# Patient Record
Sex: MALE | Race: BLACK OR AFRICAN AMERICAN | Employment: UNEMPLOYED | URBAN - METROPOLITAN AREA
[De-identification: names, ages, dates, MRNs, and addresses within clinical notes are randomized per-mention and may not be internally consistent; named-entity substitution may affect disease eponyms.]

---

## 2021-04-01 ENCOUNTER — HOSPITAL ENCOUNTER (EMERGENCY)
Age: 51
Discharge: HOME | End: 2021-04-01
Payer: COMMERCIAL

## 2021-04-01 VITALS
WEIGHT: 207 LBS | SYSTOLIC BLOOD PRESSURE: 122 MMHG | RESPIRATION RATE: 18 BRPM | OXYGEN SATURATION: 99 % | TEMPERATURE: 98.2 F | HEART RATE: 88 BPM | HEIGHT: 74 IN | DIASTOLIC BLOOD PRESSURE: 78 MMHG | BODY MASS INDEX: 26.56 KG/M2

## 2021-04-01 DIAGNOSIS — T30.0 BURN: Primary | ICD-10-CM

## 2021-04-01 PROCEDURE — 2500000003 HC RX 250 WO HCPCS: Performed by: PHYSICIAN ASSISTANT

## 2021-04-01 PROCEDURE — 99285 EMERGENCY DEPT VISIT HI MDM: CPT

## 2021-04-01 RX ORDER — CEPHALEXIN 500 MG/1
500 CAPSULE ORAL 4 TIMES DAILY
Qty: 40 CAPSULE | Refills: 0 | Status: SHIPPED | OUTPATIENT
Start: 2021-04-01 | End: 2021-04-04 | Stop reason: ALTCHOICE

## 2021-04-01 RX ORDER — HYDROCODONE BITARTRATE AND ACETAMINOPHEN 5; 325 MG/1; MG/1
1 TABLET ORAL EVERY 4 HOURS PRN
Qty: 8 TABLET | Refills: 0 | Status: SHIPPED | OUTPATIENT
Start: 2021-04-01 | End: 2021-04-04

## 2021-04-01 RX ORDER — CEPHALEXIN 500 MG/1
500 CAPSULE ORAL 4 TIMES DAILY
Qty: 40 CAPSULE | Refills: 0 | Status: SHIPPED | OUTPATIENT
Start: 2021-04-01 | End: 2021-04-01 | Stop reason: SDUPTHER

## 2021-04-01 RX ADMIN — SILVER SULFADIAZINE: 10 CREAM TOPICAL at 19:35

## 2021-04-01 ASSESSMENT — PAIN SCALES - GENERAL: PAINLEVEL_OUTOF10: 10

## 2021-04-01 ASSESSMENT — PAIN DESCRIPTION - ORIENTATION: ORIENTATION: LEFT
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
## 2021-04-04 ENCOUNTER — HOSPITAL ENCOUNTER (EMERGENCY)
Age: 51
Discharge: HOME | DRG: 383 | End: 2021-04-04
Attending: EMERGENCY MEDICINE
Payer: COMMERCIAL

## 2021-04-04 VITALS
RESPIRATION RATE: 12 BRPM | HEART RATE: 85 BPM | BODY MASS INDEX: 25.67 KG/M2 | TEMPERATURE: 98.7 F | WEIGHT: 200 LBS | HEIGHT: 74 IN | DIASTOLIC BLOOD PRESSURE: 89 MMHG | SYSTOLIC BLOOD PRESSURE: 126 MMHG | OXYGEN SATURATION: 100 %

## 2021-04-04 DIAGNOSIS — T30.0 BURN: Primary | ICD-10-CM

## 2021-04-04 PROCEDURE — 6370000000 HC RX 637 (ALT 250 FOR IP): Performed by: EMERGENCY MEDICINE

## 2021-04-04 PROCEDURE — 99283 EMERGENCY DEPT VISIT LOW MDM: CPT

## 2021-04-04 RX ORDER — CLINDAMYCIN HYDROCHLORIDE 150 MG/1
300 CAPSULE ORAL ONCE
Status: COMPLETED | OUTPATIENT
Start: 2021-04-04 | End: 2021-04-04

## 2021-04-04 RX ORDER — CLINDAMYCIN HYDROCHLORIDE 300 MG/1
300 CAPSULE ORAL 3 TIMES DAILY
Qty: 30 CAPSULE | Refills: 0 | Status: ON HOLD | OUTPATIENT
Start: 2021-04-04 | End: 2021-04-08 | Stop reason: HOSPADM

## 2021-04-04 RX ORDER — ONDANSETRON 4 MG/1
4 TABLET, ORALLY DISINTEGRATING ORAL EVERY 8 HOURS PRN
Qty: 15 TABLET | Refills: 0 | Status: SHIPPED | OUTPATIENT
Start: 2021-04-04 | End: 2021-10-10

## 2021-04-04 RX ADMIN — CLINDAMYCIN HYDROCHLORIDE 300 MG: 150 CAPSULE ORAL at 22:48

## 2021-04-04 ASSESSMENT — PAIN DESCRIPTION - ORIENTATION: ORIENTATION: LEFT

## 2021-04-04 ASSESSMENT — ENCOUNTER SYMPTOMS
WHEEZING: 0
COLOR CHANGE: 1
SHORTNESS OF BREATH: 0
VOMITING: 0
ABDOMINAL PAIN: 0
CHEST TIGHTNESS: 0
COUGH: 0
FACIAL SWELLING: 0
NAUSEA: 0
RESPIRATORY NEGATIVE: 1
GASTROINTESTINAL NEGATIVE: 1

## 2021-04-04 ASSESSMENT — PAIN DESCRIPTION - LOCATION: LOCATION: ARM
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
## 2021-04-06 ENCOUNTER — HOSPITAL ENCOUNTER (INPATIENT)
Age: 51
LOS: 2 days | Discharge: HOME | DRG: 383 | End: 2021-04-08
Attending: INTERNAL MEDICINE | Admitting: INTERNAL MEDICINE
Payer: COMMERCIAL

## 2021-04-06 DIAGNOSIS — L03.114 CELLULITIS OF LEFT UPPER EXTREMITY: Primary | ICD-10-CM

## 2021-04-06 DIAGNOSIS — Z78.9 FAILURE OF OUTPATIENT TREATMENT: ICD-10-CM

## 2021-04-06 DIAGNOSIS — T30.0 BURN: ICD-10-CM

## 2021-04-06 PROBLEM — L03.90 CELLULITIS: Status: ACTIVE | Noted: 2021-04-06

## 2021-04-06 LAB
ALBUMIN SERPL-MCNC: 3.4 GM/DL (ref 3.4–5)
ALP BLD-CCNC: 97 IU/L (ref 40–129)
ALT SERPL-CCNC: 32 U/L (ref 10–40)
ANION GAP SERPL CALCULATED.3IONS-SCNC: 11 MMOL/L (ref 4–16)
ANION GAP SERPL CALCULATED.3IONS-SCNC: 9 MMOL/L (ref 4–16)
AST SERPL-CCNC: 28 IU/L (ref 15–37)
BASOPHILS ABSOLUTE: 0 K/CU MM
BASOPHILS ABSOLUTE: 0 K/CU MM
BASOPHILS RELATIVE PERCENT: 0.2 % (ref 0–1)
BASOPHILS RELATIVE PERCENT: 0.2 % (ref 0–1)
BILIRUB SERPL-MCNC: 0.4 MG/DL (ref 0–1)
BUN BLDV-MCNC: 13 MG/DL (ref 6–23)
BUN BLDV-MCNC: 14 MG/DL (ref 6–23)
CALCIUM SERPL-MCNC: 8.6 MG/DL (ref 8.3–10.6)
CALCIUM SERPL-MCNC: 8.6 MG/DL (ref 8.3–10.6)
CHLORIDE BLD-SCNC: 101 MMOL/L (ref 99–110)
CHLORIDE BLD-SCNC: 99 MMOL/L (ref 99–110)
CO2: 23 MMOL/L (ref 21–32)
CO2: 26 MMOL/L (ref 21–32)
CREAT SERPL-MCNC: 1.4 MG/DL (ref 0.9–1.3)
CREAT SERPL-MCNC: 1.5 MG/DL (ref 0.9–1.3)
DIFFERENTIAL TYPE: ABNORMAL
DIFFERENTIAL TYPE: ABNORMAL
EOSINOPHILS ABSOLUTE: 0.1 K/CU MM
EOSINOPHILS ABSOLUTE: 0.1 K/CU MM
EOSINOPHILS RELATIVE PERCENT: 2.1 % (ref 0–3)
EOSINOPHILS RELATIVE PERCENT: 2.4 % (ref 0–3)
GFR AFRICAN AMERICAN: >60 ML/MIN/1.73M2
GFR AFRICAN AMERICAN: >60 ML/MIN/1.73M2
GFR NON-AFRICAN AMERICAN: 50 ML/MIN/1.73M2
GFR NON-AFRICAN AMERICAN: 54 ML/MIN/1.73M2
GLUCOSE BLD-MCNC: 107 MG/DL (ref 70–99)
GLUCOSE BLD-MCNC: 125 MG/DL (ref 70–99)
HCT VFR BLD CALC: 47.6 % (ref 42–52)
HCT VFR BLD CALC: 48.4 % (ref 42–52)
HEMOGLOBIN: 15.3 GM/DL (ref 13.5–18)
HEMOGLOBIN: 15.7 GM/DL (ref 13.5–18)
IMMATURE NEUTROPHIL %: 0.4 % (ref 0–0.43)
IMMATURE NEUTROPHIL %: 0.4 % (ref 0–0.43)
LYMPHOCYTES ABSOLUTE: 0.5 K/CU MM
LYMPHOCYTES ABSOLUTE: 0.5 K/CU MM
LYMPHOCYTES RELATIVE PERCENT: 10.2 % (ref 24–44)
LYMPHOCYTES RELATIVE PERCENT: 11.6 % (ref 24–44)
MCH RBC QN AUTO: 26.9 PG (ref 27–31)
MCH RBC QN AUTO: 27.1 PG (ref 27–31)
MCHC RBC AUTO-ENTMCNC: 32.1 % (ref 32–36)
MCHC RBC AUTO-ENTMCNC: 32.4 % (ref 32–36)
MCV RBC AUTO: 83.4 FL (ref 78–100)
MCV RBC AUTO: 83.8 FL (ref 78–100)
MONOCYTES ABSOLUTE: 0.6 K/CU MM
MONOCYTES ABSOLUTE: 0.7 K/CU MM
MONOCYTES RELATIVE PERCENT: 12.3 % (ref 0–4)
MONOCYTES RELATIVE PERCENT: 14 % (ref 0–4)
NUCLEATED RBC %: 0 %
NUCLEATED RBC %: 0 %
PDW BLD-RTO: 12.6 % (ref 11.7–14.9)
PDW BLD-RTO: 12.7 % (ref 11.7–14.9)
PLATELET # BLD: 187 K/CU MM (ref 140–440)
PLATELET # BLD: 191 K/CU MM (ref 140–440)
PMV BLD AUTO: 10.8 FL (ref 7.5–11.1)
PMV BLD AUTO: 11 FL (ref 7.5–11.1)
POTASSIUM SERPL-SCNC: 3.6 MMOL/L (ref 3.5–5.1)
POTASSIUM SERPL-SCNC: 4 MMOL/L (ref 3.5–5.1)
RBC # BLD: 5.68 M/CU MM (ref 4.6–6.2)
RBC # BLD: 5.8 M/CU MM (ref 4.6–6.2)
SEGMENTED NEUTROPHILS ABSOLUTE COUNT: 3.3 K/CU MM
SEGMENTED NEUTROPHILS ABSOLUTE COUNT: 3.8 K/CU MM
SEGMENTED NEUTROPHILS RELATIVE PERCENT: 73.1 % (ref 36–66)
SEGMENTED NEUTROPHILS RELATIVE PERCENT: 73.1 % (ref 36–66)
SODIUM BLD-SCNC: 134 MMOL/L (ref 135–145)
SODIUM BLD-SCNC: 135 MMOL/L (ref 135–145)
TOTAL IMMATURE NEUTOROPHIL: 0.02 K/CU MM
TOTAL IMMATURE NEUTOROPHIL: 0.02 K/CU MM
TOTAL NUCLEATED RBC: 0 K/CU MM
TOTAL NUCLEATED RBC: 0 K/CU MM
TOTAL PROTEIN: 7.5 GM/DL (ref 6.4–8.2)
WBC # BLD: 4.6 K/CU MM (ref 4–10.5)
WBC # BLD: 5.2 K/CU MM (ref 4–10.5)

## 2021-04-06 PROCEDURE — 6360000002 HC RX W HCPCS: Performed by: PHYSICIAN ASSISTANT

## 2021-04-06 PROCEDURE — 2580000003 HC RX 258: Performed by: INTERNAL MEDICINE

## 2021-04-06 PROCEDURE — 36415 COLL VENOUS BLD VENIPUNCTURE: CPT

## 2021-04-06 PROCEDURE — 87040 BLOOD CULTURE FOR BACTERIA: CPT

## 2021-04-06 PROCEDURE — 80048 BASIC METABOLIC PNL TOTAL CA: CPT

## 2021-04-06 PROCEDURE — 99283 EMERGENCY DEPT VISIT LOW MDM: CPT

## 2021-04-06 PROCEDURE — 85025 COMPLETE CBC W/AUTO DIFF WBC: CPT

## 2021-04-06 PROCEDURE — 1200000000 HC SEMI PRIVATE

## 2021-04-06 PROCEDURE — 80053 COMPREHEN METABOLIC PANEL: CPT

## 2021-04-06 PROCEDURE — 2500000003 HC RX 250 WO HCPCS: Performed by: INTERNAL MEDICINE

## 2021-04-06 PROCEDURE — 2580000003 HC RX 258: Performed by: PHYSICIAN ASSISTANT

## 2021-04-06 RX ORDER — SODIUM CHLORIDE 9 MG/ML
INJECTION, SOLUTION INTRAVENOUS CONTINUOUS
Status: DISCONTINUED | OUTPATIENT
Start: 2021-04-06 | End: 2021-04-08 | Stop reason: HOSPADM

## 2021-04-06 RX ORDER — POLYETHYLENE GLYCOL 3350 17 G/17G
17 POWDER, FOR SOLUTION ORAL DAILY PRN
Status: DISCONTINUED | OUTPATIENT
Start: 2021-04-06 | End: 2021-04-08 | Stop reason: HOSPADM

## 2021-04-06 RX ORDER — SODIUM CHLORIDE 9 MG/ML
25 INJECTION, SOLUTION INTRAVENOUS PRN
Status: DISCONTINUED | OUTPATIENT
Start: 2021-04-06 | End: 2021-04-08 | Stop reason: HOSPADM

## 2021-04-06 RX ORDER — ONDANSETRON 2 MG/ML
4 INJECTION INTRAMUSCULAR; INTRAVENOUS EVERY 6 HOURS PRN
Status: DISCONTINUED | OUTPATIENT
Start: 2021-04-06 | End: 2021-04-08 | Stop reason: HOSPADM

## 2021-04-06 RX ORDER — ACETAMINOPHEN 650 MG/1
650 SUPPOSITORY RECTAL EVERY 6 HOURS PRN
Status: DISCONTINUED | OUTPATIENT
Start: 2021-04-06 | End: 2021-04-08 | Stop reason: HOSPADM

## 2021-04-06 RX ORDER — PROMETHAZINE HYDROCHLORIDE 25 MG/1
12.5 TABLET ORAL EVERY 6 HOURS PRN
Status: DISCONTINUED | OUTPATIENT
Start: 2021-04-06 | End: 2021-04-08 | Stop reason: HOSPADM

## 2021-04-06 RX ORDER — SODIUM CHLORIDE 0.9 % (FLUSH) 0.9 %
10 SYRINGE (ML) INJECTION EVERY 12 HOURS SCHEDULED
Status: DISCONTINUED | OUTPATIENT
Start: 2021-04-06 | End: 2021-04-08 | Stop reason: HOSPADM

## 2021-04-06 RX ORDER — OXYCODONE HYDROCHLORIDE AND ACETAMINOPHEN 5; 325 MG/1; MG/1
1 TABLET ORAL ONCE
Status: COMPLETED | OUTPATIENT
Start: 2021-04-06 | End: 2021-04-08

## 2021-04-06 RX ORDER — ACETAMINOPHEN 325 MG/1
650 TABLET ORAL EVERY 6 HOURS PRN
Status: DISCONTINUED | OUTPATIENT
Start: 2021-04-06 | End: 2021-04-08 | Stop reason: HOSPADM

## 2021-04-06 RX ORDER — SODIUM CHLORIDE 0.9 % (FLUSH) 0.9 %
10 SYRINGE (ML) INJECTION PRN
Status: DISCONTINUED | OUTPATIENT
Start: 2021-04-06 | End: 2021-04-08 | Stop reason: HOSPADM

## 2021-04-06 RX ADMIN — SILVER SULFADIAZINE: 10 CREAM TOPICAL at 20:15

## 2021-04-06 RX ADMIN — VANCOMYCIN HYDROCHLORIDE 1750 MG: 5 INJECTION, POWDER, LYOPHILIZED, FOR SOLUTION INTRAVENOUS at 15:30

## 2021-04-06 RX ADMIN — CEFTRIAXONE 1000 MG: 1 INJECTION, POWDER, FOR SOLUTION INTRAMUSCULAR; INTRAVENOUS at 15:41

## 2021-04-06 RX ADMIN — SODIUM CHLORIDE: 9 INJECTION, SOLUTION INTRAVENOUS at 18:36

## 2021-04-06 ASSESSMENT — PAIN SCALES - GENERAL
PAINLEVEL_OUTOF10: 3
PAINLEVEL_OUTOF10: 5

## 2021-04-06 ASSESSMENT — PAIN DESCRIPTION - ORIENTATION: ORIENTATION: LEFT

## 2021-04-06 ASSESSMENT — PAIN DESCRIPTION - PAIN TYPE: TYPE: ACUTE PAIN
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
## 2021-04-06 NOTE — LETTER
[place] [address]  Phone: [phone]  Fax: [phone]             April 8, 2021    Patient: Carson Meza   YOB: 1970   Date of Visit: 4/6/2021       To Whom It May Concern:    Carson Meza was seen and treated in our facility  beginning 4/6/2021 until . He may return to school on Thursday, April 15th after surgery follow up. Sincerely,       Charlene Gutiérrez.  Nereida Sanders         Signature:__________________________________

## 2021-04-06 NOTE — H&P
History and Physical      Name:  Keiry Morejon /Age/Sex: 1970  (53 y.o. male)   MRN & CSN:  [id number] & [id number] Admission Date/Time: 2021  1:33 PM   Location:  [address] [place] [address] PCP: No primary care provider on file. Hospital Day: 1    Assessment and Plan:   Keiry Morejon is a 53 y.o.  male  who presents with:    Left forearm burn associated infection-cellulitis  -We will treat with cefepime 2 g IV every 12 hours to cover possible Pseudomonas and with IV vancomycin  -Request wound culture  -Check biomarkers  -Consult infectious disease    Burn, left upper extremity  -Appears to be a full-thickness third-degree burn  -Wound care  -I spoke with Dr. Donna Whitaker for a consult -keep n.p.o. after midnight for now in case he needs debridement in the morning    Elevated creatinine-1.5-we will hydrate him  -Baseline creatinine is not known -if creatinine does not improve with hydration he will need follow-up    Mild hyperglycemia-check hemoglobin A1c    History of Present Illness:     Chief Complaint:   Keiry Morejon is a 53 y.o.  male  who presents with a burn left upper extremity    Rigo Nath presented to ED today for his third visit. He accidentally injured his left forearm 1 week ago ironing clothes. He does not have a primary care physician. He presented to ED and was prescribed Keflex on . He did not get better and he presented to ED again on  and was prescribed clindamycin. Today he came to the ER with worsening pain in his left forearm, and with the redness around the burn. He felt feverish at home. He also was diaphoretic. I was asked to admit him.        PCP -none, he just moved here from [facility] 2 to 3 months ago    Past medical history: Denies any medical problems    Past surgical history: Had groin hernia surgery-the hernia he said developed after he lifted weights    Medications: No chronic medications    Family history: His mother  of ovarian cancer    Social history  Tob use - quit 1993      10-14 point ROS reviewed negative, unless as noted above  -No vomiting or bleeding noted    Objective:   No intake or output data in the 24 hours ending 04/06/21 1956   Vitals:   Vitals:    04/06/21 1750   BP: 120/72   Pulse: 76   Resp: 16   Temp: 98.3 °F (36.8 °C)   SpO2: 97%     Physical Exam:    GEN Awake male, sitting upright in bed. Appears given age. Body mass index is 25.04 kg/m². EYES extraocular muscles intact  HENT Mucous membranes are moist.   RESP Clear to auscultation, no wheezes, rales or rhonchi. Symmetric chest movement while on room air. CARDIO/VASC S1/S2 auscultated. Regular rate without appreciable murmurs, rubs, or gallops. GI Abdomen is soft without significant tenderness, masses, or guarding.  Angulo catheter is not present. SKIN Normal coloration, warm, dry. Left forearm full-thickness wound noted with a waxy whitish appearance. There is extensive surrounding redness on the ventral part of the left forearm consistent with cellulitis  NEURO Cranial nerves appear grossly intact, normal speech, no lateralizing weakness. PSYCH Awake, alert, oriented x 4. Affect appropriate. Past Medical History: PMHx: History reviewed. No pertinent past medical history. PSHx:  has no past surgical history on file. Allergies: No Known Allergies  Home Medications:   Prior to Admission medications    Medication Sig Start Date End Date Taking? Authorizing Provider   clindamycin (CLEOCIN) 300 MG capsule Take 1 capsule by mouth 3 times daily for 10 days 4/4/21 4/14/21  Gamaliel Dougherty, DO   ondansetron (ZOFRAN ODT) 4 MG disintegrating tablet Take 1 tablet by mouth every 8 hours as needed for Nausea 4/4/21   Gamaliel Dougherty, DO   silver sulfADIAZINE (SILVADENE) 1 % cream Apply topically twice daily.  4/1/21   CALEB Livingston     FHx: Mother passed away of ovarian cancer  SHx:   Social History     Socioeconomic History    Marital status: Single     Spouse name: None  Number of children: None    Years of education: None    Highest education level: None   Occupational History    None   Social Needs    Financial resource strain: None    Food insecurity     Worry: None     Inability: None    Transportation needs     Medical: None     Non-medical: None   Tobacco Use    Smoking status: Never Smoker   Substance and Sexual Activity    Alcohol use: Not Currently    Drug use: Never    Sexual activity: None   Lifestyle    Physical activity     Days per week: None     Minutes per session: None    Stress: None   Relationships    Social connections     Talks on phone: None     Gets together: None     Attends Sabianism service: None     Active member of club or organization: None     Attends meetings of clubs or organizations: None     Relationship status: None    Intimate partner violence     Fear of current or ex partner: None     Emotionally abused: None     Physically abused: None     Forced sexual activity: None   Other Topics Concern    None   Social History Narrative    None     I spoke with the ED provider, and we decided to admit him    White count is 5.2    Electronically signed by Heidy Valiente MD on 4/6/2021 at 7:56 PM

## 2021-04-06 NOTE — ED NOTES
Patient wanted to stay in clothes and refused the percocet at this time.       Milagro Walker RN  04/06/21 850 Ana Linder RN  04/06/21 8338

## 2021-04-07 ENCOUNTER — ANESTHESIA EVENT (OUTPATIENT)
Dept: OPERATING ROOM | Age: 51
DRG: 383 | End: 2021-04-07
Payer: COMMERCIAL

## 2021-04-07 ENCOUNTER — ANESTHESIA (OUTPATIENT)
Dept: OPERATING ROOM | Age: 51
DRG: 383 | End: 2021-04-07
Payer: COMMERCIAL

## 2021-04-07 VITALS — SYSTOLIC BLOOD PRESSURE: 132 MMHG | OXYGEN SATURATION: 99 % | DIASTOLIC BLOOD PRESSURE: 83 MMHG

## 2021-04-07 LAB
ANION GAP SERPL CALCULATED.3IONS-SCNC: 11 MMOL/L (ref 4–16)
BUN BLDV-MCNC: 16 MG/DL (ref 6–23)
CALCIUM SERPL-MCNC: 8.2 MG/DL (ref 8.3–10.6)
CHLORIDE BLD-SCNC: 102 MMOL/L (ref 99–110)
CO2: 23 MMOL/L (ref 21–32)
CREAT SERPL-MCNC: 1.3 MG/DL (ref 0.9–1.3)
ESTIMATED AVERAGE GLUCOSE: 117 MG/DL
GFR AFRICAN AMERICAN: >60 ML/MIN/1.73M2
GFR NON-AFRICAN AMERICAN: 59 ML/MIN/1.73M2
GLUCOSE BLD-MCNC: 107 MG/DL (ref 70–99)
HBA1C MFR BLD: 5.7 % (ref 4.2–6.3)
HIGH SENSITIVE C-REACTIVE PROTEIN: 155.3 MG/L
POTASSIUM SERPL-SCNC: 3.9 MMOL/L (ref 3.5–5.1)
PROCALCITONIN: 0.31
SARS-COV-2, NAAT: NOT DETECTED
SODIUM BLD-SCNC: 136 MMOL/L (ref 135–145)
SOURCE: NORMAL

## 2021-04-07 PROCEDURE — 6370000000 HC RX 637 (ALT 250 FOR IP): Performed by: PHYSICIAN ASSISTANT

## 2021-04-07 PROCEDURE — 87075 CULTR BACTERIA EXCEPT BLOOD: CPT

## 2021-04-07 PROCEDURE — 87186 SC STD MICRODIL/AGAR DIL: CPT

## 2021-04-07 PROCEDURE — 0JBH0ZZ EXCISION OF LEFT LOWER ARM SUBCUTANEOUS TISSUE AND FASCIA, OPEN APPROACH: ICD-10-PCS | Performed by: SURGERY

## 2021-04-07 PROCEDURE — 87635 SARS-COV-2 COVID-19 AMP PRB: CPT

## 2021-04-07 PROCEDURE — 2580000003 HC RX 258: Performed by: NURSE ANESTHETIST, CERTIFIED REGISTERED

## 2021-04-07 PROCEDURE — 2500000003 HC RX 250 WO HCPCS: Performed by: NURSE ANESTHETIST, CERTIFIED REGISTERED

## 2021-04-07 PROCEDURE — 80048 BASIC METABOLIC PNL TOTAL CA: CPT

## 2021-04-07 PROCEDURE — 2580000003 HC RX 258: Performed by: INTERNAL MEDICINE

## 2021-04-07 PROCEDURE — 6370000000 HC RX 637 (ALT 250 FOR IP): Performed by: NURSE PRACTITIONER

## 2021-04-07 PROCEDURE — 36415 COLL VENOUS BLD VENIPUNCTURE: CPT

## 2021-04-07 PROCEDURE — 3700000001 HC ADD 15 MINUTES (ANESTHESIA): Performed by: SURGERY

## 2021-04-07 PROCEDURE — 2580000003 HC RX 258: Performed by: PHYSICIAN ASSISTANT

## 2021-04-07 PROCEDURE — 99999 PR OFFICE/OUTPT VISIT,PROCEDURE ONLY: CPT | Performed by: PHYSICIAN ASSISTANT

## 2021-04-07 PROCEDURE — 6360000002 HC RX W HCPCS: Performed by: PHYSICIAN ASSISTANT

## 2021-04-07 PROCEDURE — 99211 OFF/OP EST MAY X REQ PHY/QHP: CPT

## 2021-04-07 PROCEDURE — 3600000002 HC SURGERY LEVEL 2 BASE: Performed by: SURGERY

## 2021-04-07 PROCEDURE — 83036 HEMOGLOBIN GLYCOSYLATED A1C: CPT

## 2021-04-07 PROCEDURE — 1200000000 HC SEMI PRIVATE

## 2021-04-07 PROCEDURE — 84145 PROCALCITONIN (PCT): CPT

## 2021-04-07 PROCEDURE — 87077 CULTURE AEROBIC IDENTIFY: CPT

## 2021-04-07 PROCEDURE — 87070 CULTURE OTHR SPECIMN AEROBIC: CPT

## 2021-04-07 PROCEDURE — 99253 IP/OBS CNSLTJ NEW/EST LOW 45: CPT | Performed by: PHYSICIAN ASSISTANT

## 2021-04-07 PROCEDURE — 6360000002 HC RX W HCPCS: Performed by: INTERNAL MEDICINE

## 2021-04-07 PROCEDURE — 94761 N-INVAS EAR/PLS OXIMETRY MLT: CPT

## 2021-04-07 PROCEDURE — 2709999900 HC NON-CHARGEABLE SUPPLY: Performed by: SURGERY

## 2021-04-07 PROCEDURE — APPSS60 APP SPLIT SHARED TIME 46-60 MINUTES: Performed by: NURSE PRACTITIONER

## 2021-04-07 PROCEDURE — APPNB45 APP NON BILLABLE 31-45 MINUTES: Performed by: PHYSICIAN ASSISTANT

## 2021-04-07 PROCEDURE — 3700000000 HC ANESTHESIA ATTENDED CARE: Performed by: SURGERY

## 2021-04-07 PROCEDURE — 3600000012 HC SURGERY LEVEL 2 ADDTL 15MIN: Performed by: SURGERY

## 2021-04-07 PROCEDURE — 6360000002 HC RX W HCPCS: Performed by: NURSE ANESTHETIST, CERTIFIED REGISTERED

## 2021-04-07 PROCEDURE — 99255 IP/OBS CONSLTJ NEW/EST HI 80: CPT | Performed by: INTERNAL MEDICINE

## 2021-04-07 PROCEDURE — 86141 C-REACTIVE PROTEIN HS: CPT

## 2021-04-07 RX ORDER — PROPOFOL 10 MG/ML
INJECTION, EMULSION INTRAVENOUS PRN
Status: DISCONTINUED | OUTPATIENT
Start: 2021-04-07 | End: 2021-04-07 | Stop reason: SDUPTHER

## 2021-04-07 RX ORDER — LIDOCAINE HYDROCHLORIDE 20 MG/ML
INJECTION, SOLUTION INTRAVENOUS PRN
Status: DISCONTINUED | OUTPATIENT
Start: 2021-04-07 | End: 2021-04-07 | Stop reason: SDUPTHER

## 2021-04-07 RX ORDER — METRONIDAZOLE 250 MG/1
500 TABLET ORAL EVERY 8 HOURS SCHEDULED
Status: DISCONTINUED | OUTPATIENT
Start: 2021-04-07 | End: 2021-04-08

## 2021-04-07 RX ORDER — SODIUM CHLORIDE, SODIUM LACTATE, POTASSIUM CHLORIDE, CALCIUM CHLORIDE 600; 310; 30; 20 MG/100ML; MG/100ML; MG/100ML; MG/100ML
INJECTION, SOLUTION INTRAVENOUS CONTINUOUS PRN
Status: DISCONTINUED | OUTPATIENT
Start: 2021-04-07 | End: 2021-04-07 | Stop reason: SDUPTHER

## 2021-04-07 RX ORDER — PROPOFOL 10 MG/ML
INJECTION, EMULSION INTRAVENOUS CONTINUOUS PRN
Status: DISCONTINUED | OUTPATIENT
Start: 2021-04-07 | End: 2021-04-07 | Stop reason: SDUPTHER

## 2021-04-07 RX ORDER — KETAMINE HCL 50MG/ML(1)
SYRINGE (ML) INTRAVENOUS PRN
Status: DISCONTINUED | OUTPATIENT
Start: 2021-04-07 | End: 2021-04-07 | Stop reason: SDUPTHER

## 2021-04-07 RX ADMIN — SODIUM CHLORIDE, POTASSIUM CHLORIDE, SODIUM LACTATE AND CALCIUM CHLORIDE: 600; 310; 30; 20 INJECTION, SOLUTION INTRAVENOUS at 10:47

## 2021-04-07 RX ADMIN — PROPOFOL 50 MG: 10 INJECTION, EMULSION INTRAVENOUS at 11:00

## 2021-04-07 RX ADMIN — PROPOFOL 200 MCG/KG/MIN: 10 INJECTION, EMULSION INTRAVENOUS at 11:00

## 2021-04-07 RX ADMIN — METRONIDAZOLE 500 MG: 250 TABLET ORAL at 18:00

## 2021-04-07 RX ADMIN — ACETAMINOPHEN 650 MG: 325 TABLET ORAL at 12:26

## 2021-04-07 RX ADMIN — SILVER SULFADIAZINE: 10 CREAM TOPICAL at 08:50

## 2021-04-07 RX ADMIN — Medication 25 MG: at 10:59

## 2021-04-07 RX ADMIN — PROPOFOL 100 MG: 10 INJECTION, EMULSION INTRAVENOUS at 10:57

## 2021-04-07 RX ADMIN — SODIUM CHLORIDE, PRESERVATIVE FREE 10 ML: 5 INJECTION INTRAVENOUS at 22:44

## 2021-04-07 RX ADMIN — LIDOCAINE HYDROCHLORIDE 100 MG: 20 INJECTION, SOLUTION INTRAVENOUS at 10:57

## 2021-04-07 RX ADMIN — VANCOMYCIN HYDROCHLORIDE 1250 MG: 5 INJECTION, POWDER, LYOPHILIZED, FOR SOLUTION INTRAVENOUS at 12:26

## 2021-04-07 RX ADMIN — CEFEPIME HYDROCHLORIDE 2000 MG: 2 INJECTION, POWDER, FOR SOLUTION INTRAVENOUS at 01:03

## 2021-04-07 RX ADMIN — METRONIDAZOLE 500 MG: 250 TABLET ORAL at 22:44

## 2021-04-07 RX ADMIN — CEFEPIME HYDROCHLORIDE 2000 MG: 2 INJECTION, POWDER, FOR SOLUTION INTRAVENOUS at 11:10

## 2021-04-07 RX ADMIN — CEFEPIME HYDROCHLORIDE 2000 MG: 2 INJECTION, POWDER, FOR SOLUTION INTRAVENOUS at 22:45

## 2021-04-07 ASSESSMENT — PULMONARY FUNCTION TESTS
PIF_VALUE: 0
PIF_VALUE: 2
PIF_VALUE: 1
PIF_VALUE: 2
PIF_VALUE: 2
PIF_VALUE: 0
PIF_VALUE: 1
PIF_VALUE: 2
PIF_VALUE: 3
PIF_VALUE: 2
PIF_VALUE: 0

## 2021-04-07 ASSESSMENT — ENCOUNTER SYMPTOMS
SORE THROAT: 0
EYE REDNESS: 0
ANAL BLEEDING: 0
CONSTIPATION: 0
EYE ITCHING: 0
PHOTOPHOBIA: 0
RECTAL PAIN: 0
CHOKING: 0
APNEA: 0
BACK PAIN: 0
COLOR CHANGE: 1
STRIDOR: 0

## 2021-04-07 ASSESSMENT — PAIN DESCRIPTION - PAIN TYPE
TYPE: ACUTE PAIN
TYPE: SURGICAL PAIN
TYPE: SURGICAL PAIN

## 2021-04-07 ASSESSMENT — PAIN DESCRIPTION - ORIENTATION
ORIENTATION: LEFT
ORIENTATION: LEFT

## 2021-04-07 ASSESSMENT — PAIN SCALES - GENERAL
PAINLEVEL_OUTOF10: 10
PAINLEVEL_OUTOF10: 0
PAINLEVEL_OUTOF10: 5

## 2021-04-07 ASSESSMENT — PAIN DESCRIPTION - LOCATION
LOCATION: ARM

## 2021-04-07 ASSESSMENT — PAIN DESCRIPTION - FREQUENCY: FREQUENCY: CONTINUOUS

## 2021-04-07 ASSESSMENT — PAIN DESCRIPTION - DESCRIPTORS: DESCRIPTORS: BURNING

## 2021-04-07 NOTE — CARE COORDINATION
Chart reviewed and screened for possible needs at discharge. Pt lives at home and was independent PTA. Pt does not have PCP as he recently moved here, PCP list added to discharge instructions. Pt has insurance to help w/ RX's. CM available for possible needs at discharge.

## 2021-04-07 NOTE — ANESTHESIA PRE PROCEDURE
Department of Anesthesiology  Preprocedure Note       Name:  Becky Perez   Age:  53 y.o.  :  1970                                          MRN:  [id number]         Date:  2021      Surgeon: Talia Phan):  Keiry Fletcher MD    Procedure: Procedure(s):  ARM INCISION AND DRAINAGE    Medications prior to admission:   Prior to Admission medications    Medication Sig Start Date End Date Taking? Authorizing Provider   clindamycin (CLEOCIN) 300 MG capsule Take 1 capsule by mouth 3 times daily for 10 days 21  Luclaith Kay, DO   ondansetron (ZOFRAN ODT) 4 MG disintegrating tablet Take 1 tablet by mouth every 8 hours as needed for Nausea 21   Luclaith Kay, DO   silver sulfADIAZINE (SILVADENE) 1 % cream Apply topically twice daily.  21   CALEB Wallace       Current medications:    Current Facility-Administered Medications   Medication Dose Route Frequency Provider Last Rate Last Admin    vancomycin (VANCOCIN) intermittent dosing (placeholder)   Other RX Placeholder Pricilla Bates MD        oxyCODONE-acetaminophen (PERCOCET) 5-325 MG per tablet 1 tablet  1 tablet Oral Once Pricilla Bates MD        silver sulfADIAZINE (SILVADENE) 1 % cream   Topical Daily Pricilla Bates MD   Given at 21 0850    sodium chloride flush 0.9 % injection 10 mL  10 mL Intravenous 2 times per day Pricilla Bates MD        sodium chloride flush 0.9 % injection 10 mL  10 mL Intravenous PRN Pricilla Bates MD        0.9 % sodium chloride infusion  25 mL Intravenous PRN Pricilla Bates MD        enoxaparin (LOVENOX) injection 40 mg  40 mg Subcutaneous Daily Pricilla Bates MD        [facility]) tablet 12.5 mg  12.5 mg Oral Q6H PRN Pricilla Bates MD        Or    ondansetron [facility]) injection 4 mg  4 mg Intravenous Q6H PRN Pricilla Bates MD        polyethylene glycol [facility]) packet 17 g  17 g Oral Daily PRN Pricilla Bates MD       [facility] acetaminophen (TYLENOL) tablet 650 mg  650 mg Oral Q6H PRN Selin Weinstein MD        Or    acetaminophen (TYLENOL) suppository 650 mg  650 mg Rectal Q6H PRN Selin Weinstein MD        0.9 % sodium chloride infusion   Intravenous Continuous Selin Weinstein MD 50 mL/hr at 04/06/21 1836 New Bag at 04/06/21 1836    cefepime (MAXIPIME) 2000 mg IVPB minibag  2,000 mg Intravenous Q12H Selin Weinstein MD   Stopped at 04/07/21 0145    0.9 % sodium chloride infusion   Intravenous Continuous Selin Weinstein MD 75 mL/hr at 04/07/21 0102 Rate Change at 04/07/21 0102     Facility-Administered Medications Ordered in Other Encounters   Medication Dose Route Frequency Provider Last Rate Last Admin    propofol injection    PRN Alfonzo Hernandez APRN - CRNA   50 mg at 04/07/21 1100    propofol injection    Continuous PRN AILEEN Almonte - CRNA 106.2 mL/hr at 04/07/21 1100 200 mcg/kg/min at 04/07/21 1100    lidocaine (cardiac) (XYLOCAINE) injection    PRN Alfonzo Hernandez APRN - CRNA   100 mg at 04/07/21 1057    Ketamine (KETALAR) injection syringe    PRN AILEEN Almonte - CRNA   25 mg at 04/07/21 1059       Allergies:  No Known Allergies    Problem List:    Patient Active Problem List   Diagnosis Code    Cellulitis L03.90       Past Medical History:  History reviewed. No pertinent past medical history. Past Surgical History:  History reviewed. No pertinent surgical history.     Social History:    Social History     Tobacco Use    Smoking status: Never Smoker   Substance Use Topics    Alcohol use: Not Currently                                Counseling given: Not Answered      Vital Signs (Current):   Vitals:    04/06/21 1750 04/06/21 2006 04/07/21 0439 04/07/21 0715   BP: 120/72 128/73 112/68 138/89   Pulse: 76 83 61 92   Resp: 16 16 16 18   Temp: 36.8 °C (98.3 °F) 37 °C (98.6 °F) 36.6 °C (97.8 °F)    TempSrc: Oral Oral Oral    SpO2: 97% 98%  97%   Weight:       Height: Cardiovascular:  Exercise tolerance: good (>4 METS),            Beta Blocker:  Not on Beta Blocker         Neuro/Psych:   (+) psychiatric history: stable without treatment            GI/Hepatic/Renal:             Endo/Other:              Pt had no PAT visit       Abdominal:           Vascular:                                        Anesthesia Plan      MAC     ASA 2     (Former IV drug user. Pt. Requests no opioids)  Induction: intravenous. Anesthetic plan and risks discussed with patient.                       [place] Susan, APRN - CRNA   4/7/2021

## 2021-04-07 NOTE — PROGRESS NOTES
[facility]  consulted by Dr. Radha Poe for monitoring and adjustment. Indication for treatment: L arm cellulitis   Goal trough: 10-15 mcg/mL, AUC <500     Pertinent Laboratory Values:   Temp Readings from Last 3 Encounters:   04/07/21 97.8 °F (36.6 °C) (Oral)   04/04/21 98.7 °F (37.1 °C) (Oral)   04/01/21 98.2 °F (36.8 °C) (Oral)     Recent Labs     04/06/21  1140 04/06/21 2002   WBC 5.2 4.6     Recent Labs     04/06/21  1140 04/06/21 2002 04/07/21  0621   BUN 13 14 16   CREATININE 1.5* 1.4* 1.3     Estimated Creatinine Clearance: 80 mL/min (based on SCr of 1.3 mg/dL). Intake/Output Summary (Last 24 hours) at 4/7/2021 1120  Last data filed at 4/7/2021 1117  Gross per 24 hour   Intake 760 ml   Output --   Net 760 ml       Pertinent Cultures:  Date    Source    Results  4/7   Blood    Sent   4/7   Wound    Sent     Vancomycin level:   TROUGH:  No results for input(s): VANCOTROUGH in the last 72 hours. RANDOM:  No results for input(s): VANCORANDOM in the last 72 hours. Assessment:  · Failed outpatient abx   · WBC and temperature: WBC WNL, afebrile  · SCr, BUN, and urine output: probable MIRIAM, renal function is improving   · Day(s) of therapy: 2   · Vancomycin concentration: to be collected    Plan:  · Vancomycin 1750 mg x 1 (20 mg/kg) loading dose in the ED   · Will schedule vancomycin 1250 mg q12h x 2 doses as renal function is improving   · Further dosing dependent on renal labs   · Pharmacy will continue to monitor patient and adjust therapy as indicated    [facility]18    Thank you for the consult.   Temi Sam, PharmD, BCPS    4/7/2021 11:20 AM

## 2021-04-07 NOTE — CONSULTS
04/07/2021    CREATININE 1.3 04/07/2021    GFRAA >60 04/07/2021    LABGLOM 59 04/07/2021    GLUCOSE 107 04/07/2021    PROT 7.5 04/06/2021    LABALBU 3.4 04/06/2021    CALCIUM 8.2 04/07/2021    BILITOT 0.4 04/06/2021    ALKPHOS 97 04/06/2021    AST 28 04/06/2021    ALT 32 04/06/2021     Albumin:    Lab Results   Component Value Date    LABALBU 3.4 04/06/2021     PT/INR:  No results found for: PROTIME, INR  HgBA1c:  No results found for: LABA1C      Assessment:     Patient Active Problem List   Diagnosis    Cellulitis    Third degree burn of left forearm       Measurements:  Wound 04/06/21 Radial Left burn deep tissue (Active)   Wound Etiology Burn 04/07/21 1107   Dressing Status Clean;Dry; Intact 04/07/21 1116   Wound Cleansed Betadine/povidone iodine;Cleansed with saline 04/07/21 1107   Dressing/Treatment ABD; Other (comment) 04/07/21 1116   Dressing Change Due 04/07/21 04/06/21 2024   Wound Length (cm) 6 cm 04/07/21 0847   Wound Width (cm) 5 cm 04/07/21 0847   Wound Depth (cm) 0.2 cm 04/07/21 0847   Wound Surface Area (cm^2) 30 cm^2 04/07/21 0847   Wound Volume (cm^3) 6 cm^3 04/07/21 0847   Distance Tunneling (cm) 0 cm 04/07/21 0847   Tunneling Position ___ O'Clock 0 04/07/21 0847   Undermining Starts ___ O'Clock 0 04/07/21 0847   Undermining Ends___ O'Clock 0 04/07/21 0847   Undermining Maxium Distance (cm) 0 04/07/21 0847   Drainage Amount Scant 04/07/21 1107   Drainage Description Thin 04/07/21 1107   Odor None 04/07/21 1107   Dianna-wound Assessment Fragile 04/07/21 0847   Margins Defined edges 04/07/21 0847   Wound Thickness Description not for Pressure Injury Full thickness 04/07/21 0847   Number of days: 0       Response to treatment:  Well tolerated by patient.      Pain Assessment:  Severity:  0  Quality of pain: none  Wound Pain Timing/Severity:   Premedicated:     Plan:     Plan of Care: Wound 04/06/21 Radial Left burn deep tissue-Dressing/Treatment: ABD, Other (comment)(telfa, sulfadine, tape)     Patient in bed agreeable to wound care assessment. Pt has 3rd degree burn to left arm from an iron. Wound has yellow/black necrotic tissue and is to be debrided today. Cleansed with NS. Measured and pictured. Dressing as above with silvadene cream. Pt is generally not at risk for skin breakdown AEB celena score. Specialty Bed Required :  no  [] Low Air Loss   [] Pressure Redistribution  [] Fluid Immersion  [] Bariatric  [] Total Pressure Relief  [] Other:     Discharge Plan:  Placement for patient upon discharge: home  Hospice Care: no  Patient appropriate for Outpatient [address]: [facility]    Patient/Caregiver Teaching:  Level of patient/caregiver understanding able to: cares explained as given. Electronically signed by Dinh Odonnell RN,  on 4/7/2021 at 11:50 AM

## 2021-04-07 NOTE — CONSULTS
Infectious Disease Consult Note  2021   Patient Name: Annie Lopez : 1970   Impression   LUE Third Degree Burn with Cellulitis:  · Afebrile, no leukocytosis  · Blood cultures -pending  · -LUE wound culture pending  · Pct 0.309, .3  · -S/P per Dr. Eduardo Cortes:      MIRIAM     Multi-morbidity: per PMHx:  No pertinent history on file  Plan:   Continue cefepime and vancomycin  · Start flagyl 500 mg po tid   Trend CRP and Pct  · Await Blood cultures from -  · Await LUE wound culture -  · Follow with Dr. Eduardo Cortes, planned LUE debridement in OR under MAC    Thank you for allowing me to consult in the care of this patient.  ------------------------  REASON FOR CONSULT: Infective syndrome     Requested by: Dr. Vega Aman is a 53 y.o. -American male who was admitted 2021 for further evaluation and management of burn of left upper extremity. He presented to the ED the day of admission for the third time before being admitted. He reports he accidentally injured his left forearm about 1 week ago while ironing his clothing. He presented to the ED due to not having a PCP and was prescribed Keflex on 21. He then returned on 21 and was prescribed clindamycin. He states his injury was worsening with increased erythema around the site of the burn and he felt as if he has a fever/chills with diaphoresis. ? Infectious diseases service was consulted to evaluate the pt, and recommend further investigative and therapeutic measures. ROS: Other systems reviewed Including eyes, ENT, respiratory, cardiovascular, GI, , dermatologic, neurologic, psych, hem/lymphatic, musculoskeletal and endocrine were negative other than what is mentioned above. Patient Active Problem List    Diagnosis Date Noted    Cellulitis 2021     History reviewed. No pertinent past medical history. History reviewed. No pertinent surgical history. History reviewed.  No pertinent family history. Infectious disease related family history - not contibutory. SOCIAL HISTORY  Social History     Tobacco Use    Smoking status: Never Smoker   Substance Use Topics    Alcohol use: Not Currently       Born:[place], [place]   Lives:[place], [place] with wife   Occupation:   No recent travel of significance.  No recent unusual exposures.  NO pets   ? ALLERGIES  No Known Allergies   MEDICATIONS  Reviewed and are per the chart/EMR. ? Antibiotics:   Present:  Cefepime 4/6-  Vancomycin 4/6-  Flagyl 4/7-    Past:  Clindamycin 4/4  ?  -------------------------------------------------------------------------------------------------------------------    Vital Signs:  Vitals:    04/07/21 0715   BP: 138/89   Pulse: 92   Resp: 18   Temp:    SpO2: 97%         Exam:    VS: noted; wt 195 lb (88.5 kg) Height 6'2\"  Gen: alert and oriented X3, no distress  Skin: no stigmata of endocarditis  Wounds: C/D/I left lower arm   HEMT: AT/NC Oropharynx pink, moist, and without lesions or exudates; dentition in good state of repair  Eyes: PERRLA, EOMI, conjunctiva pink, sclera anicteric. Neck: Supple. Trachea midline. No LAD. Chest: no distress and CTA. Good air movement. Heart: RRR and no MRG. Abd: soft, non-distended, no tenderness, no hepatomegaly. Normoactive bowel sounds. Ext: no clubbing, cyanosis, or edema  Catheter Site: without erythema or tenderness  Neuro: Mental status intact. CN 2-12 intact and no focal sensory or motor deficits    ? Diagnostic Studies: reviewed  ? ? I have examined this patient and available medical records on this date and have made the above observations, conclusions and recommendations. Electronically signed by: Electronically signed by Zara No.  AILEEN Spence CNP on 4/7/2021 at 8:24 AM

## 2021-04-07 NOTE — CONSULTS
ondansetron (ZOFRAN) injection 4 mg  4 mg Intravenous Q6H PRN Selin Weinstein MD        polyethylene glycol [facility]) packet 17 g  17 g Oral Daily PRN Selin Weinstein MD        acetaminophen (TYLENOL) tablet 650 mg  650 mg Oral Q6H PRN eSlin Weinstein MD        Or    acetaminophen (TYLENOL) suppository 650 mg  650 mg Rectal Q6H PRN Selin Weinstein MD        0.9 % sodium chloride infusion   Intravenous Continuous Selin Weinstein MD 50 mL/hr at 04/06/21 1836 New Bag at 04/06/21 1836    cefepime (MAXIPIME) 2000 mg IVPB minibag  2,000 mg Intravenous Q12H Selin Weinstein MD   Stopped at 04/07/21 0145    0.9 % sodium chloride infusion   Intravenous Continuous Selin Weinstein MD 75 mL/hr at 04/07/21 0102 Rate Change at 04/07/21 0102       Allergies:  Patient has no known allergies.     Social History:   Social History     Socioeconomic History    Marital status: Single     Spouse name: None    Number of children: None    Years of education: None    Highest education level: None   Occupational History    None   Social Needs    Financial resource strain: None    Food insecurity     Worry: None     Inability: None    Transportation needs     Medical: None     Non-medical: None   Tobacco Use    Smoking status: Never Smoker   Substance and Sexual Activity    Alcohol use: Not Currently    Drug use: Never    Sexual activity: None   Lifestyle    Physical activity     Days per week: None     Minutes per session: None    Stress: None   Relationships    Social connections     Talks on phone: None     Gets together: None     Attends Faith service: None     Active member of club or organization: None     Attends meetings of clubs or organizations: None     Relationship status: None    Intimate partner violence     Fear of current or ex partner: None     Emotionally abused: None     Physically abused: None     Forced sexual activity: None   Other Topics Concern    None   Social throughout the wound bed. The wound edges are well defined. No tissue necrosis noted. There is cellulitis surrounding the wound, especially distally   Skin:     General: Skin is warm. Neurological:      Mental Status: He is alert and oriented to person, place, and time. DATA:    CBC:   Lab Results   Component Value Date    WBC 4.6 04/06/2021    RBC 5.68 04/06/2021    HGB 15.3 04/06/2021    HCT 47.6 04/06/2021    MCV 83.8 04/06/2021    MCH 26.9 04/06/2021    MCHC 32.1 04/06/2021    RDW 12.6 04/06/2021     04/06/2021    MPV 11.0 04/06/2021     CMP:    Lab Results   Component Value Date     04/07/2021    K 3.9 04/07/2021     04/07/2021    CO2 23 04/07/2021    BUN 16 04/07/2021    CREATININE 1.3 04/07/2021    GFRAA >60 04/07/2021    LABGLOM 59 04/07/2021    GLUCOSE 107 04/07/2021    PROT 7.5 04/06/2021    LABALBU 3.4 04/06/2021    CALCIUM 8.2 04/07/2021    BILITOT 0.4 04/06/2021    ALKPHOS 97 04/06/2021    AST 28 04/06/2021    ALT 32 04/06/2021       IMPRESSION:        Patient Active Problem List:     Cellulitis    R forearm burn - 3rd degree    PLAN:    Pt is NPO. Will do a left forearm debridement in the OR today under MAC. Consent signed. Rapid covid sent    Patient and plan of care discussed with Dr. Mita Romo.     Larinda Lundborg, PA-C

## 2021-04-08 VITALS
WEIGHT: 195 LBS | HEIGHT: 74 IN | SYSTOLIC BLOOD PRESSURE: 124 MMHG | BODY MASS INDEX: 25.03 KG/M2 | DIASTOLIC BLOOD PRESSURE: 82 MMHG | TEMPERATURE: 98.2 F | HEART RATE: 69 BPM | RESPIRATION RATE: 16 BRPM | OXYGEN SATURATION: 98 %

## 2021-04-08 LAB
ANION GAP SERPL CALCULATED.3IONS-SCNC: 10 MMOL/L (ref 4–16)
BUN BLDV-MCNC: 16 MG/DL (ref 6–23)
CALCIUM SERPL-MCNC: 8.3 MG/DL (ref 8.3–10.6)
CHLORIDE BLD-SCNC: 105 MMOL/L (ref 99–110)
CO2: 25 MMOL/L (ref 21–32)
CREAT SERPL-MCNC: 1.1 MG/DL (ref 0.9–1.3)
EKG ATRIAL RATE: 69 BPM
EKG DIAGNOSIS: NORMAL
EKG P AXIS: 76 DEGREES
EKG P-R INTERVAL: 174 MS
EKG Q-T INTERVAL: 402 MS
EKG QRS DURATION: 92 MS
EKG QTC CALCULATION (BAZETT): 430 MS
EKG R AXIS: 71 DEGREES
EKG T AXIS: 31 DEGREES
EKG VENTRICULAR RATE: 69 BPM
GFR AFRICAN AMERICAN: >60 ML/MIN/1.73M2
GFR NON-AFRICAN AMERICAN: >60 ML/MIN/1.73M2
GLUCOSE BLD-MCNC: 98 MG/DL (ref 70–99)
HIGH SENSITIVE C-REACTIVE PROTEIN: 87.1 MG/L
POTASSIUM SERPL-SCNC: 4.1 MMOL/L (ref 3.5–5.1)
PROCALCITONIN: 0.2
SODIUM BLD-SCNC: 140 MMOL/L (ref 135–145)

## 2021-04-08 PROCEDURE — 99232 SBSQ HOSP IP/OBS MODERATE 35: CPT | Performed by: NURSE PRACTITIONER

## 2021-04-08 PROCEDURE — 6370000000 HC RX 637 (ALT 250 FOR IP): Performed by: NURSE PRACTITIONER

## 2021-04-08 PROCEDURE — 86141 C-REACTIVE PROTEIN HS: CPT

## 2021-04-08 PROCEDURE — 80048 BASIC METABOLIC PNL TOTAL CA: CPT

## 2021-04-08 PROCEDURE — 99024 POSTOP FOLLOW-UP VISIT: CPT | Performed by: PHYSICIAN ASSISTANT

## 2021-04-08 PROCEDURE — 36415 COLL VENOUS BLD VENIPUNCTURE: CPT

## 2021-04-08 PROCEDURE — 6360000002 HC RX W HCPCS: Performed by: INTERNAL MEDICINE

## 2021-04-08 PROCEDURE — 94761 N-INVAS EAR/PLS OXIMETRY MLT: CPT

## 2021-04-08 PROCEDURE — 6370000000 HC RX 637 (ALT 250 FOR IP): Performed by: PHYSICIAN ASSISTANT

## 2021-04-08 PROCEDURE — 11042 DBRDMT SUBQ TIS 1ST 20SQCM/<: CPT | Performed by: SURGERY

## 2021-04-08 PROCEDURE — 80202 ASSAY OF VANCOMYCIN: CPT

## 2021-04-08 PROCEDURE — 84145 PROCALCITONIN (PCT): CPT

## 2021-04-08 PROCEDURE — APPNB30 APP NON BILLABLE TIME 0-30 MINS: Performed by: PHYSICIAN ASSISTANT

## 2021-04-08 PROCEDURE — 93005 ELECTROCARDIOGRAM TRACING: CPT | Performed by: INTERNAL MEDICINE

## 2021-04-08 PROCEDURE — 2580000003 HC RX 258: Performed by: INTERNAL MEDICINE

## 2021-04-08 PROCEDURE — 11045 DBRDMT SUBQ TISS EACH ADDL: CPT | Performed by: SURGERY

## 2021-04-08 PROCEDURE — 93010 ELECTROCARDIOGRAM REPORT: CPT | Performed by: INTERNAL MEDICINE

## 2021-04-08 RX ORDER — LINEZOLID 600 MG/1
600 TABLET, FILM COATED ORAL EVERY 12 HOURS SCHEDULED
Status: DISCONTINUED | OUTPATIENT
Start: 2021-04-08 | End: 2021-04-08 | Stop reason: HOSPADM

## 2021-04-08 RX ORDER — LEVOFLOXACIN 500 MG/1
500 TABLET, FILM COATED ORAL DAILY
Status: DISCONTINUED | OUTPATIENT
Start: 2021-04-08 | End: 2021-04-08 | Stop reason: HOSPADM

## 2021-04-08 RX ORDER — LINEZOLID 600 MG/1
600 TABLET, FILM COATED ORAL EVERY 12 HOURS SCHEDULED
Qty: 14 TABLET | Refills: 0 | Status: SHIPPED | OUTPATIENT
Start: 2021-04-08 | End: 2021-04-15

## 2021-04-08 RX ORDER — LEVOFLOXACIN 500 MG/1
500 TABLET, FILM COATED ORAL DAILY
Qty: 7 TABLET | Refills: 0 | Status: SHIPPED | OUTPATIENT
Start: 2021-04-08 | End: 2021-04-15

## 2021-04-08 RX ORDER — OXYCODONE HYDROCHLORIDE AND ACETAMINOPHEN 5; 325 MG/1; MG/1
1 TABLET ORAL EVERY 6 HOURS PRN
Qty: 12 TABLET | Refills: 0 | Status: SHIPPED | OUTPATIENT
Start: 2021-04-08 | End: 2021-04-11

## 2021-04-08 RX ADMIN — METRONIDAZOLE 500 MG: 250 TABLET ORAL at 06:09

## 2021-04-08 RX ADMIN — SILVER SULFADIAZINE: 10 CREAM TOPICAL at 11:25

## 2021-04-08 RX ADMIN — LINEZOLID 600 MG: 600 TABLET, FILM COATED ORAL at 11:21

## 2021-04-08 RX ADMIN — OXYCODONE HYDROCHLORIDE AND ACETAMINOPHEN 1 TABLET: 5; 325 TABLET ORAL at 00:36

## 2021-04-08 RX ADMIN — VANCOMYCIN HYDROCHLORIDE 1250 MG: 5 INJECTION, POWDER, LYOPHILIZED, FOR SOLUTION INTRAVENOUS at 00:36

## 2021-04-08 RX ADMIN — LEVOFLOXACIN 500 MG: 500 TABLET, FILM COATED ORAL at 11:21

## 2021-04-08 ASSESSMENT — ENCOUNTER SYMPTOMS
ANAL BLEEDING: 0
STRIDOR: 0
CONSTIPATION: 0
BACK PAIN: 0
RECTAL PAIN: 0
CHOKING: 0
SORE THROAT: 0
COLOR CHANGE: 1
EYE ITCHING: 0
PHOTOPHOBIA: 0
APNEA: 0
EYE REDNESS: 0

## 2021-04-08 ASSESSMENT — PAIN DESCRIPTION - DESCRIPTORS: DESCRIPTORS: BURNING

## 2021-04-08 ASSESSMENT — PAIN SCALES - GENERAL: PAINLEVEL_OUTOF10: 10

## 2021-04-08 ASSESSMENT — PAIN DESCRIPTION - PROGRESSION: CLINICAL_PROGRESSION: GRADUALLY WORSENING

## 2021-04-08 ASSESSMENT — PAIN DESCRIPTION - ORIENTATION: ORIENTATION: LEFT

## 2021-04-08 NOTE — OP NOTE
Excisional Debridement Procedure:    Patient ID:  Mahesh Moore  [id number]  72 y.o.  1970         Indications: Fourth degree burn to the left forearm    Pre-operative Diagnosis: Fourth degree burn in the left forearm    Post-operative Diagnosis: same    Procedrure:  Excisional debridement into sub-q tissue  Total square cm area 50    Surgeon: Cr Cabello MD    First Assistant: Dionte Lane PA-C  The  Use of a first assistant was necessary for the proper positioning, prepping, and draping of the patient, as well as the safe and expeditious execution of the case and closure of skin and subcutaneous tissues. Findings: Full-thickness burn into the subcutaneous tissue with necrosis of veins      Procedure Details: The risk, benefits, expected outcome, and alternative to the recommended procedure have been discussed with the patient. Patient understands and wants to proceed with the procedure. Procedure: The above wound was cleansed with betadine and draped in a sterile fashion. An Excisional Debridement into sub-q tissue. scissors, forceps and # 10 blade scalpel were used to remove the devitalized skin and subcutaneous tissue with the necrotic veins as well. Hemostasis was acquired using Bovie cautery. The wound was deep and measured 5 cm x 10 cm 1 cm deep. Silvadene and gauze was applied dressing. Patient is likely to require skin graft to heal this forearm wound. Wound care instructions were given.         Maya Cast MD
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
## 2021-04-08 NOTE — FLOWSHEET NOTE
Left forearm dressing changed. Demonstrated to pt and significant other dressing change process and answered all questions. Both verbalized understanding. Will continue to monitor.
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
## 2021-04-09 ENCOUNTER — CARE COORDINATION (OUTPATIENT)
Dept: CASE MANAGEMENT | Age: 51
End: 2021-04-09
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
## 2021-04-11 LAB
CULTURE: NORMAL
CULTURE: NORMAL
Lab: NORMAL
Lab: NORMAL
SPECIMEN: NORMAL
SPECIMEN: NORMAL

## 2021-04-12 LAB
CULTURE: ABNORMAL
CULTURE: ABNORMAL
Lab: ABNORMAL
SPECIMEN: ABNORMAL

## 2021-04-15 ENCOUNTER — OFFICE VISIT (OUTPATIENT)
Dept: SURGERY | Age: 51
End: 2021-04-15
Payer: COMMERCIAL

## 2021-04-15 VITALS
TEMPERATURE: 97.4 F | OXYGEN SATURATION: 98 % | DIASTOLIC BLOOD PRESSURE: 66 MMHG | WEIGHT: 200.4 LBS | BODY MASS INDEX: 25.73 KG/M2 | SYSTOLIC BLOOD PRESSURE: 110 MMHG | HEART RATE: 72 BPM

## 2021-04-15 DIAGNOSIS — T22.312D FULL THICKNESS BURN OF LEFT FOREARM, SUBSEQUENT ENCOUNTER: Primary | ICD-10-CM

## 2021-04-15 PROCEDURE — 11042 DBRDMT SUBQ TIS 1ST 20SQCM/<: CPT | Performed by: SURGERY

## 2021-04-16 RX ORDER — HYDROCODONE BITARTRATE AND ACETAMINOPHEN 5; 325 MG/1; MG/1
1 TABLET ORAL EVERY 6 HOURS PRN
Qty: 20 TABLET | Refills: 0 | Status: SHIPPED | OUTPATIENT
Start: 2021-04-16 | End: 2021-04-23

## 2021-04-16 ASSESSMENT — ENCOUNTER SYMPTOMS
EYE ITCHING: 0
STRIDOR: 0
COLOR CHANGE: 0
CONSTIPATION: 0
BACK PAIN: 0
SORE THROAT: 0
ANAL BLEEDING: 0
CHOKING: 0
PHOTOPHOBIA: 0
APNEA: 0
RECTAL PAIN: 0
EYE REDNESS: 0
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
## 2021-04-17 ENCOUNTER — HOSPITAL ENCOUNTER (EMERGENCY)
Age: 51
Discharge: HOME | End: 2021-04-18
Attending: EMERGENCY MEDICINE
Payer: COMMERCIAL

## 2021-04-17 DIAGNOSIS — T50.904A DRUG OVERDOSE, UNDETERMINED INTENT, INITIAL ENCOUNTER: Primary | ICD-10-CM

## 2021-04-17 PROCEDURE — 99283 EMERGENCY DEPT VISIT LOW MDM: CPT

## 2021-04-18 VITALS
BODY MASS INDEX: 25.67 KG/M2 | OXYGEN SATURATION: 95 % | DIASTOLIC BLOOD PRESSURE: 93 MMHG | WEIGHT: 200 LBS | TEMPERATURE: 97.7 F | RESPIRATION RATE: 20 BRPM | SYSTOLIC BLOOD PRESSURE: 162 MMHG | HEART RATE: 83 BPM | HEIGHT: 74 IN

## 2021-04-18 PROCEDURE — 6370000000 HC RX 637 (ALT 250 FOR IP): Performed by: EMERGENCY MEDICINE

## 2021-04-18 RX ORDER — DIAPER,BRIEF,INFANT-TODD,DISP
EACH MISCELLANEOUS ONCE
Status: COMPLETED | OUTPATIENT
Start: 2021-04-18 | End: 2021-04-18

## 2021-04-18 RX ADMIN — BACITRACIN ZINC: 500 OINTMENT TOPICAL at 01:11

## 2021-04-18 ASSESSMENT — ENCOUNTER SYMPTOMS
VOMITING: 1
NAUSEA: 1
ALLERGIC/IMMUNOLOGIC NEGATIVE: 1
SHORTNESS OF BREATH: 1
EYES NEGATIVE: 1
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
## 2021-04-18 NOTE — ED TRIAGE NOTES
Pt to ED following a drug overdose. EMS reports they were called out for possible seizure activity. Pt was found lying on the floor with pinpoint pupils and O2 sat at 40%. EMS gave 4mg Narcan IM. Pt admitted to using fentanyl tonight.

## 2021-04-22 ENCOUNTER — OFFICE VISIT (OUTPATIENT)
Dept: SURGERY | Age: 51
End: 2021-04-22
Payer: COMMERCIAL

## 2021-04-22 VITALS
DIASTOLIC BLOOD PRESSURE: 86 MMHG | OXYGEN SATURATION: 97 % | SYSTOLIC BLOOD PRESSURE: 142 MMHG | HEART RATE: 71 BPM | WEIGHT: 206.4 LBS | BODY MASS INDEX: 26.5 KG/M2

## 2021-04-22 DIAGNOSIS — T22.312D FULL THICKNESS BURN OF LEFT FOREARM, SUBSEQUENT ENCOUNTER: Primary | ICD-10-CM

## 2021-04-22 PROCEDURE — G8427 DOCREV CUR MEDS BY ELIG CLIN: HCPCS | Performed by: SURGERY

## 2021-04-22 PROCEDURE — 99213 OFFICE O/P EST LOW 20 MIN: CPT | Performed by: SURGERY

## 2021-04-22 PROCEDURE — 4004F PT TOBACCO SCREEN RCVD TLK: CPT | Performed by: SURGERY

## 2021-04-22 PROCEDURE — 1111F DSCHRG MED/CURRENT MED MERGE: CPT | Performed by: SURGERY

## 2021-04-22 PROCEDURE — G8419 CALC BMI OUT NRM PARAM NOF/U: HCPCS | Performed by: SURGERY

## 2021-04-22 ASSESSMENT — ENCOUNTER SYMPTOMS
RECTAL PAIN: 0
EYE ITCHING: 0
CHOKING: 0
COLOR CHANGE: 0
ANAL BLEEDING: 0
STRIDOR: 0
EYE REDNESS: 0
PHOTOPHOBIA: 0
SORE THROAT: 0
CONSTIPATION: 0
APNEA: 0
BACK PAIN: 0
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
## 2021-04-29 ENCOUNTER — HOSPITAL ENCOUNTER (OUTPATIENT)
Age: 51
Setting detail: SPECIMEN
Discharge: HOME | End: 2021-04-29
Payer: COMMERCIAL

## 2021-04-29 ENCOUNTER — OFFICE VISIT (OUTPATIENT)
Dept: SURGERY | Age: 51
End: 2021-04-29
Payer: COMMERCIAL

## 2021-04-29 VITALS
DIASTOLIC BLOOD PRESSURE: 78 MMHG | OXYGEN SATURATION: 97 % | BODY MASS INDEX: 25.5 KG/M2 | WEIGHT: 198.6 LBS | HEART RATE: 61 BPM | SYSTOLIC BLOOD PRESSURE: 136 MMHG

## 2021-04-29 DIAGNOSIS — T22.312D FULL THICKNESS BURN OF LEFT FOREARM, SUBSEQUENT ENCOUNTER: ICD-10-CM

## 2021-04-29 DIAGNOSIS — Z01.818 PRE-OP TESTING: Primary | ICD-10-CM

## 2021-04-29 PROCEDURE — 4004F PT TOBACCO SCREEN RCVD TLK: CPT | Performed by: SURGERY

## 2021-04-29 PROCEDURE — G8427 DOCREV CUR MEDS BY ELIG CLIN: HCPCS | Performed by: SURGERY

## 2021-04-29 PROCEDURE — 1111F DSCHRG MED/CURRENT MED MERGE: CPT | Performed by: SURGERY

## 2021-04-29 PROCEDURE — G8419 CALC BMI OUT NRM PARAM NOF/U: HCPCS | Performed by: SURGERY

## 2021-04-29 PROCEDURE — U0003 INFECTIOUS AGENT DETECTION BY NUCLEIC ACID (DNA OR RNA); SEVERE ACUTE RESPIRATORY SYNDROME CORONAVIRUS 2 (SARS-COV-2) (CORONAVIRUS DISEASE [COVID-19]), AMPLIFIED PROBE TECHNIQUE, MAKING USE OF HIGH THROUGHPUT TECHNOLOGIES AS DESCRIBED BY CMS-2020-01-R: HCPCS

## 2021-04-29 PROCEDURE — U0005 INFEC AGEN DETEC AMPLI PROBE: HCPCS

## 2021-04-29 PROCEDURE — 99213 OFFICE O/P EST LOW 20 MIN: CPT | Performed by: SURGERY

## 2021-04-29 ASSESSMENT — ENCOUNTER SYMPTOMS
SORE THROAT: 0
EYE ITCHING: 0
COLOR CHANGE: 0
ANAL BLEEDING: 0
PHOTOPHOBIA: 0
CONSTIPATION: 0
APNEA: 0
BACK PAIN: 0
STRIDOR: 0
RECTAL PAIN: 0
EYE REDNESS: 0
CHOKING: 0

## 2021-04-29 NOTE — PATIENT INSTRUCTIONS
Pre-Procedure COVID-19 Self Testing  Quarantine Instructions  Day of Surgery Instructions         What to do before my surgery:    All patients scheduled for elective surgery must test for COVID19 72-96 hours prior to the surgery date.  Pre-Procedure COVID-19 Self-Test will be scheduled for you by your provider.  You can receive your Pre-Procedure COVID-19 Self-Test at:  [facility]. [address] [facility], [facility], [facility], [address] [facility],Third Floor   If you do not have the COVID-19 test we will cancel or reschedule your procedure   Once you test you must quarantine at home until after your procedure with only your immediate family members or whoever lives with you.  If you must work during your quarantine period, we ask that you continue to practice social distancing, wear a mask that covers your mouth and nose and perform all hand hygiene as recommended by the CDC.  If you must go to the grocery, etc. and cannot get someone to do this for you please wear a mask that covers your mouth and nose and perform all hand hygiene as recommended by the CDC.  Your surgeon's office will notify you with any concerns about your test result. What can I expect on the day of surgery?  Arrive at the time the office or hospital staff tell you on the day of your procedure.  Wear a mask when entering the hospital.     A member of the hospital staff will take your temperature and ask you a few questions as you enter the building.  In abundance of caution for the safety of all our patients and staff, please follow all hospital visitor guidelines in place at the time of your procedure. The staff caring for you will stay in close communication with your loved one and keep them updated on progress.  Please provide a phone number for us to use when communicating with your family or ride home.    When you are ready to discharge, we will notify your family/person with you to bring the car to the front entrance. We will take you to them after you receive all of your discharge instructions.

## 2021-04-29 NOTE — PROGRESS NOTES
4/29/21 - . LM concerning  surgery on  5/4/21 - have your covid-19 test performed within 10  days of your procedure, then quarantine yourself until after your procedure. Please call the PAT Nurse.
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
## 2021-05-01 LAB
SARS-COV-2: NOT DETECTED
SOURCE: NORMAL

## 2021-05-02 ENCOUNTER — ANESTHESIA EVENT (OUTPATIENT)
Dept: OPERATING ROOM | Age: 51
End: 2021-05-02
Payer: COMMERCIAL
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
## 2021-05-04 ENCOUNTER — HOSPITAL ENCOUNTER (OUTPATIENT)
Age: 51
Setting detail: OUTPATIENT SURGERY
Discharge: HOME | End: 2021-05-04
Attending: SURGERY | Admitting: SURGERY
Payer: COMMERCIAL

## 2021-05-04 ENCOUNTER — ANESTHESIA (OUTPATIENT)
Dept: OPERATING ROOM | Age: 51
End: 2021-05-04
Payer: COMMERCIAL

## 2021-05-04 VITALS
TEMPERATURE: 96.7 F | DIASTOLIC BLOOD PRESSURE: 87 MMHG | RESPIRATION RATE: 16 BRPM | OXYGEN SATURATION: 97 % | HEART RATE: 69 BPM | SYSTOLIC BLOOD PRESSURE: 129 MMHG

## 2021-05-04 VITALS
TEMPERATURE: 97.7 F | RESPIRATION RATE: 10 BRPM | SYSTOLIC BLOOD PRESSURE: 132 MMHG | DIASTOLIC BLOOD PRESSURE: 102 MMHG | OXYGEN SATURATION: 100 %

## 2021-05-04 PROCEDURE — 15100 SPLT AGRFT T/A/L 1ST 100SQCM: CPT | Performed by: SURGERY

## 2021-05-04 PROCEDURE — 3600000012 HC SURGERY LEVEL 2 ADDTL 15MIN: Performed by: SURGERY

## 2021-05-04 PROCEDURE — 7100000010 HC PHASE II RECOVERY - FIRST 15 MIN: Performed by: SURGERY

## 2021-05-04 PROCEDURE — APPNB180 APP NON BILLABLE TIME > 60 MINS: Performed by: PHYSICIAN ASSISTANT

## 2021-05-04 PROCEDURE — 15002 WOUND PREP TRK/ARM/LEG: CPT | Performed by: PHYSICIAN ASSISTANT

## 2021-05-04 PROCEDURE — 7100000001 HC PACU RECOVERY - ADDTL 15 MIN: Performed by: SURGERY

## 2021-05-04 PROCEDURE — 3700000001 HC ADD 15 MINUTES (ANESTHESIA): Performed by: SURGERY

## 2021-05-04 PROCEDURE — 2580000003 HC RX 258: Performed by: ANESTHESIOLOGY

## 2021-05-04 PROCEDURE — 97606 NEG PRS WND THER DME>50 SQCM: CPT | Performed by: SURGERY

## 2021-05-04 PROCEDURE — 2709999900 HC NON-CHARGEABLE SUPPLY: Performed by: SURGERY

## 2021-05-04 PROCEDURE — 15002 WOUND PREP TRK/ARM/LEG: CPT | Performed by: SURGERY

## 2021-05-04 PROCEDURE — 6360000002 HC RX W HCPCS: Performed by: NURSE ANESTHETIST, CERTIFIED REGISTERED

## 2021-05-04 PROCEDURE — 7100000011 HC PHASE II RECOVERY - ADDTL 15 MIN: Performed by: SURGERY

## 2021-05-04 PROCEDURE — 15100 SPLT AGRFT T/A/L 1ST 100SQCM: CPT | Performed by: PHYSICIAN ASSISTANT

## 2021-05-04 PROCEDURE — 2500000003 HC RX 250 WO HCPCS: Performed by: NURSE ANESTHETIST, CERTIFIED REGISTERED

## 2021-05-04 PROCEDURE — 7100000000 HC PACU RECOVERY - FIRST 15 MIN: Performed by: SURGERY

## 2021-05-04 PROCEDURE — 3700000000 HC ANESTHESIA ATTENDED CARE: Performed by: SURGERY

## 2021-05-04 PROCEDURE — 6360000002 HC RX W HCPCS: Performed by: PHYSICIAN ASSISTANT

## 2021-05-04 PROCEDURE — 3600000002 HC SURGERY LEVEL 2 BASE: Performed by: SURGERY

## 2021-05-04 RX ORDER — FENTANYL CITRATE 50 UG/ML
25 INJECTION, SOLUTION INTRAMUSCULAR; INTRAVENOUS EVERY 5 MIN PRN
Status: DISCONTINUED | OUTPATIENT
Start: 2021-05-04 | End: 2021-05-04 | Stop reason: HOSPADM

## 2021-05-04 RX ORDER — SODIUM CHLORIDE, SODIUM LACTATE, POTASSIUM CHLORIDE, CALCIUM CHLORIDE 600; 310; 30; 20 MG/100ML; MG/100ML; MG/100ML; MG/100ML
INJECTION, SOLUTION INTRAVENOUS CONTINUOUS
Status: DISCONTINUED | OUTPATIENT
Start: 2021-05-04 | End: 2021-05-04 | Stop reason: HOSPADM

## 2021-05-04 RX ORDER — LIDOCAINE HYDROCHLORIDE 20 MG/ML
INJECTION, SOLUTION EPIDURAL; INFILTRATION; INTRACAUDAL; PERINEURAL PRN
Status: DISCONTINUED | OUTPATIENT
Start: 2021-05-04 | End: 2021-05-04 | Stop reason: SDUPTHER

## 2021-05-04 RX ORDER — FENTANYL CITRATE 50 UG/ML
50 INJECTION, SOLUTION INTRAMUSCULAR; INTRAVENOUS EVERY 5 MIN PRN
Status: DISCONTINUED | OUTPATIENT
Start: 2021-05-04 | End: 2021-05-04 | Stop reason: HOSPADM

## 2021-05-04 RX ORDER — PROPOFOL 10 MG/ML
INJECTION, EMULSION INTRAVENOUS PRN
Status: DISCONTINUED | OUTPATIENT
Start: 2021-05-04 | End: 2021-05-04 | Stop reason: SDUPTHER

## 2021-05-04 RX ORDER — IBUPROFEN 800 MG/1
800 TABLET ORAL EVERY 8 HOURS PRN
Qty: 30 TABLET | Refills: 0 | Status: SHIPPED | OUTPATIENT
Start: 2021-05-04 | End: 2022-01-03 | Stop reason: ALTCHOICE

## 2021-05-04 RX ORDER — LABETALOL HYDROCHLORIDE 5 MG/ML
5 INJECTION, SOLUTION INTRAVENOUS EVERY 10 MIN PRN
Status: DISCONTINUED | OUTPATIENT
Start: 2021-05-04 | End: 2021-05-04 | Stop reason: HOSPADM

## 2021-05-04 RX ORDER — ONDANSETRON 2 MG/ML
INJECTION INTRAMUSCULAR; INTRAVENOUS PRN
Status: DISCONTINUED | OUTPATIENT
Start: 2021-05-04 | End: 2021-05-04 | Stop reason: SDUPTHER

## 2021-05-04 RX ORDER — KETOROLAC TROMETHAMINE 30 MG/ML
INJECTION, SOLUTION INTRAMUSCULAR; INTRAVENOUS PRN
Status: DISCONTINUED | OUTPATIENT
Start: 2021-05-04 | End: 2021-05-04 | Stop reason: SDUPTHER

## 2021-05-04 RX ORDER — M-VIT,TX,IRON,MINS/CALC/FOLIC 27MG-0.4MG
1 TABLET ORAL DAILY
COMMUNITY

## 2021-05-04 RX ORDER — KETAMINE HCL 50MG/ML(1)
SYRINGE (ML) INTRAVENOUS PRN
Status: DISCONTINUED | OUTPATIENT
Start: 2021-05-04 | End: 2021-05-04 | Stop reason: SDUPTHER

## 2021-05-04 RX ORDER — ONDANSETRON 2 MG/ML
4 INJECTION INTRAMUSCULAR; INTRAVENOUS
Status: DISCONTINUED | OUTPATIENT
Start: 2021-05-04 | End: 2021-05-04 | Stop reason: HOSPADM

## 2021-05-04 RX ORDER — DEXAMETHASONE SODIUM PHOSPHATE 4 MG/ML
INJECTION, SOLUTION INTRA-ARTICULAR; INTRALESIONAL; INTRAMUSCULAR; INTRAVENOUS; SOFT TISSUE PRN
Status: DISCONTINUED | OUTPATIENT
Start: 2021-05-04 | End: 2021-05-04 | Stop reason: SDUPTHER

## 2021-05-04 RX ORDER — HYDRALAZINE HYDROCHLORIDE 20 MG/ML
5 INJECTION INTRAMUSCULAR; INTRAVENOUS EVERY 10 MIN PRN
Status: DISCONTINUED | OUTPATIENT
Start: 2021-05-04 | End: 2021-05-04 | Stop reason: HOSPADM

## 2021-05-04 RX ADMIN — PROPOFOL 200 MG: 10 INJECTION, EMULSION INTRAVENOUS at 12:47

## 2021-05-04 RX ADMIN — SODIUM CHLORIDE, POTASSIUM CHLORIDE, SODIUM LACTATE AND CALCIUM CHLORIDE: 600; 310; 30; 20 INJECTION, SOLUTION INTRAVENOUS at 10:19

## 2021-05-04 RX ADMIN — CEFAZOLIN SODIUM 2000 MG: 10 INJECTION, POWDER, FOR SOLUTION INTRAVENOUS at 12:55

## 2021-05-04 RX ADMIN — DEXAMETHASONE SODIUM PHOSPHATE 8 MG: 4 INJECTION, SOLUTION INTRAMUSCULAR; INTRAVENOUS at 12:56

## 2021-05-04 RX ADMIN — Medication 10 MG: at 13:07

## 2021-05-04 RX ADMIN — ONDANSETRON 4 MG: 2 INJECTION INTRAMUSCULAR; INTRAVENOUS at 12:57

## 2021-05-04 RX ADMIN — Medication 10 MG: at 12:46

## 2021-05-04 RX ADMIN — LIDOCAINE HYDROCHLORIDE 100 MG: 20 INJECTION, SOLUTION EPIDURAL; INFILTRATION; INTRACAUDAL; PERINEURAL at 12:47

## 2021-05-04 RX ADMIN — KETOROLAC TROMETHAMINE 30 MG: 30 INJECTION, SOLUTION INTRAMUSCULAR; INTRAVENOUS at 13:46

## 2021-05-04 ASSESSMENT — PULMONARY FUNCTION TESTS
PIF_VALUE: 9
PIF_VALUE: 12
PIF_VALUE: 24
PIF_VALUE: 10
PIF_VALUE: 12
PIF_VALUE: 9
PIF_VALUE: 12
PIF_VALUE: 9
PIF_VALUE: 12
PIF_VALUE: 12
PIF_VALUE: 0
PIF_VALUE: 1
PIF_VALUE: 11
PIF_VALUE: 11
PIF_VALUE: 12
PIF_VALUE: 0
PIF_VALUE: 9
PIF_VALUE: 11
PIF_VALUE: 1
PIF_VALUE: 2
PIF_VALUE: 0
PIF_VALUE: 19
PIF_VALUE: 12
PIF_VALUE: 1
PIF_VALUE: 12
PIF_VALUE: 12
PIF_VALUE: 1
PIF_VALUE: 12
PIF_VALUE: 0
PIF_VALUE: 16
PIF_VALUE: 0
PIF_VALUE: 12
PIF_VALUE: 9
PIF_VALUE: 10
PIF_VALUE: 12
PIF_VALUE: 20

## 2021-05-04 ASSESSMENT — PAIN DESCRIPTION - DESCRIPTORS
DESCRIPTORS: ACHING
DESCRIPTORS: BURNING
DESCRIPTORS: BURNING

## 2021-05-04 ASSESSMENT — PAIN SCALES - GENERAL: PAINLEVEL_OUTOF10: 10

## 2021-05-04 ASSESSMENT — PAIN DESCRIPTION - ORIENTATION: ORIENTATION: LEFT

## 2021-05-04 ASSESSMENT — PAIN DESCRIPTION - PAIN TYPE: TYPE: SURGICAL PAIN

## 2021-05-04 ASSESSMENT — PAIN DESCRIPTION - LOCATION
LOCATION: LEG
LOCATION: ARM

## 2021-05-04 NOTE — PROGRESS NOTES
1355- Pt arrived to PACU by bed. Pt placed on monitor. Alarms on and verified. Bedside hand off provied by [facility] RN and [facility]. 1410- Ice chips given. Tolerated well. 1436- PACU care complete. 1438- Bedside hand off given to [employer].

## 2021-05-04 NOTE — PROGRESS NOTES
1515 Report received from [place], [facility].  1528 Arm and leg dsgs dry and intact. Discharge instructions given to pt and significant other. Pt dressing. [address] to ask Dr. Cherie Holden about wound vac, informed no home care and follow up in office (per Pippa Agarwal). 1550 Ambulate to restroom to void. 1556 Refused WC. Friend driving pt home.
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
## 2021-05-04 NOTE — PROGRESS NOTES
0356 Bedside report received from [address] from PACU. Patient A&O, dressing to L FA, C/D/I. Dressing to L thigh C/D/I. Portable wound vac in place to L FA. Patient c/o pain 10/10 in L FA and L thigh. Patient tolerating cranberry juice. Patient ambulated to bathroom, tolerated well. Patient back to bed, call light in reach. Friend at bedside.
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
## 2021-05-10 ENCOUNTER — TELEPHONE (OUTPATIENT)
Dept: SURGERY | Age: 51
End: 2021-05-10

## 2021-05-10 NOTE — TELEPHONE ENCOUNTER
Patient called wound vac quit working yesterday. Gave [facility] number to call [phone] opt 3    Patient called back said KC advised this was on a time table and the wound vac is no longer supposed to be working at this time.

## 2021-05-10 NOTE — TELEPHONE ENCOUNTER
Office spoke with Dr. Slava Martinez,  About  Wound vac and how to remove the device. - office called the pt with no answer and no voice mail. -

## 2021-05-13 ENCOUNTER — OFFICE VISIT (OUTPATIENT)
Dept: SURGERY | Age: 51
End: 2021-05-13

## 2021-05-13 VITALS
DIASTOLIC BLOOD PRESSURE: 90 MMHG | BODY MASS INDEX: 25.54 KG/M2 | SYSTOLIC BLOOD PRESSURE: 130 MMHG | HEART RATE: 72 BPM | TEMPERATURE: 97.2 F | WEIGHT: 199 LBS | OXYGEN SATURATION: 97 % | HEIGHT: 74 IN

## 2021-05-13 DIAGNOSIS — Z09 POSTOPERATIVE EXAMINATION: Primary | ICD-10-CM

## 2021-05-13 PROCEDURE — 99024 POSTOP FOLLOW-UP VISIT: CPT | Performed by: SURGERY

## 2021-05-13 NOTE — LETTER
[address] and Robotic Surgery  [place]ensen  Phone: [phone]  Fax: [phone]    Lucian Patten MD        May 13, 2021     Patient: Manny Traylor   YOB: 1970   Date of Visit: 5/13/2021       To Whom It May Concern: It is my medical opinion that Manny Traylor may return to work on 5/15/21 with no restrictions. If you have any questions or concerns, please don't hesitate to call.     Sincerely,        Lucian Patten MD

## 2021-07-19 NOTE — PROGRESS NOTES
Chief Complaint   Patient presents with    Post-Op Check     PO Left Arm Split Thickness skin Grafting @ [facility] 5/4/21         SUBJECTIVE:  Patient here for post op visit. Pain is minimal.  Wounds: minbruising and no discharge. Past Surgical History:   Procedure Laterality Date    INCISION AND DRAINAGE Left 4/7/2021    ARM INCISION AND DRAINAGE performed by Ish Ng MD at [address] [facility] [address] Left 5/4/2021    LEFT ARM SKIN GRAFT SPLIT THICKNESS performed by Ish Ng MD at [address]     No past medical history on file. No family history on file. Social History     Socioeconomic History    Marital status: Single     Spouse name: Not on file    Number of children: Not on file    Years of education: Not on file    Highest education level: Not on file   Occupational History    Not on file   Tobacco Use    Smoking status: Never Smoker   Substance and Sexual Activity    Alcohol use: Not Currently    Drug use: Never    Sexual activity: Not on file   Other Topics Concern    Not on file   Social History Narrative    Not on file     Social Determinants of Health     Financial Resource Strain:     Difficulty of Paying Living Expenses:    Food Insecurity:     Worried About Running Out of Food in the Last Year:     [address] in the Last Year:    Transportation Needs:     Lack of Transportation (Medical):      Lack of Transportation (Non-Medical):    Physical Activity:     Days of Exercise per Week:     Minutes of Exercise per Session:    Stress:     Feeling of Stress :    Social Connections:     Frequency of Communication with Friends and Family:     Frequency of Social Gatherings with Friends and Family:     Attends Zoroastrian Services:     Active Member of Clubs or Organizations:     Attends Club or Organization Meetings:     Marital Status:    Intimate Partner Violence:     Fear of Current or Ex-Partner:     Emotionally Abused:     Physically Abused:     Sexually Abused: OBJECTIVE:   Physical Exam    Wound well healed without signs of active infection. Suture line intact. Abdomen soft, nontender, nondistended. ASSESSMENT:  Patient doing well on this post operative check. Wounds well healed. 1. Postoperative examination        PLAN:  Continue same  Increase activity as tolerated        No orders of the defined types were placed in this encounter. No orders of the defined types were placed in this encounter. Follow Up: No follow-ups on file.     Lois Gallardo MD

## 2021-10-07 ENCOUNTER — APPOINTMENT (OUTPATIENT)
Dept: CT IMAGING | Age: 51
End: 2021-10-07
Payer: COMMERCIAL

## 2021-10-07 ENCOUNTER — APPOINTMENT (OUTPATIENT)
Dept: GENERAL RADIOLOGY | Age: 51
End: 2021-10-07
Payer: COMMERCIAL

## 2021-10-07 ENCOUNTER — HOSPITAL ENCOUNTER (EMERGENCY)
Age: 51
Discharge: HOME | End: 2021-10-07
Attending: EMERGENCY MEDICINE
Payer: COMMERCIAL

## 2021-10-07 VITALS
TEMPERATURE: 98.9 F | OXYGEN SATURATION: 98 % | DIASTOLIC BLOOD PRESSURE: 70 MMHG | WEIGHT: 168 LBS | HEIGHT: 74 IN | HEART RATE: 84 BPM | SYSTOLIC BLOOD PRESSURE: 118 MMHG | BODY MASS INDEX: 21.56 KG/M2 | RESPIRATION RATE: 16 BRPM

## 2021-10-07 DIAGNOSIS — R11.2 NAUSEA VOMITING AND DIARRHEA: Primary | ICD-10-CM

## 2021-10-07 DIAGNOSIS — K52.9 COLITIS: ICD-10-CM

## 2021-10-07 DIAGNOSIS — R19.7 NAUSEA VOMITING AND DIARRHEA: Primary | ICD-10-CM

## 2021-10-07 LAB
ALBUMIN SERPL-MCNC: 4.4 GM/DL (ref 3.4–5)
ALP BLD-CCNC: 85 IU/L (ref 40–129)
ALT SERPL-CCNC: 21 U/L (ref 10–40)
ANION GAP SERPL CALCULATED.3IONS-SCNC: 12 MMOL/L (ref 4–16)
AST SERPL-CCNC: 17 IU/L (ref 15–37)
BACTERIA: ABNORMAL /HPF
BASOPHILS ABSOLUTE: 0 K/CU MM
BASOPHILS RELATIVE PERCENT: 0.3 % (ref 0–1)
BILIRUB SERPL-MCNC: 0.4 MG/DL (ref 0–1)
BILIRUBIN URINE: NEGATIVE MG/DL
BLOOD, URINE: NEGATIVE
BUN BLDV-MCNC: 12 MG/DL (ref 6–23)
CALCIUM SERPL-MCNC: 8.9 MG/DL (ref 8.3–10.6)
CHLORIDE BLD-SCNC: 104 MMOL/L (ref 99–110)
CLARITY: CLEAR
CO2: 23 MMOL/L (ref 21–32)
COLOR: YELLOW
CREAT SERPL-MCNC: 0.9 MG/DL (ref 0.9–1.3)
DIFFERENTIAL TYPE: ABNORMAL
EOSINOPHILS ABSOLUTE: 0 K/CU MM
EOSINOPHILS RELATIVE PERCENT: 0 % (ref 0–3)
GFR AFRICAN AMERICAN: >60 ML/MIN/1.73M2
GFR NON-AFRICAN AMERICAN: >60 ML/MIN/1.73M2
GLUCOSE BLD-MCNC: 147 MG/DL (ref 70–99)
GLUCOSE, URINE: NEGATIVE MG/DL
HCT VFR BLD CALC: 38 % (ref 42–52)
HEMOGLOBIN: 12.6 GM/DL (ref 13.5–18)
IMMATURE NEUTROPHIL %: 0.5 % (ref 0–0.43)
KETONES, URINE: ABNORMAL MG/DL
LEUKOCYTE ESTERASE, URINE: NEGATIVE
LIPASE: 21 IU/L (ref 13–60)
LYMPHOCYTES ABSOLUTE: 0.5 K/CU MM
LYMPHOCYTES RELATIVE PERCENT: 14.4 % (ref 24–44)
MAGNESIUM: 1.5 MG/DL (ref 1.8–2.4)
MCH RBC QN AUTO: 27.2 PG (ref 27–31)
MCHC RBC AUTO-ENTMCNC: 33.2 % (ref 32–36)
MCV RBC AUTO: 81.9 FL (ref 78–100)
MONOCYTES ABSOLUTE: 0.2 K/CU MM
MONOCYTES RELATIVE PERCENT: 5.4 % (ref 0–4)
MUCUS: ABNORMAL HPF
NITRITE URINE, QUANTITATIVE: NEGATIVE
NUCLEATED RBC %: 0 %
PDW BLD-RTO: 11.5 % (ref 11.7–14.9)
PH, URINE: 5 (ref 5–8)
PLATELET # BLD: 195 K/CU MM (ref 140–440)
PMV BLD AUTO: 11.1 FL (ref 7.5–11.1)
POTASSIUM SERPL-SCNC: 3.4 MMOL/L (ref 3.5–5.1)
PROTEIN UA: NEGATIVE MG/DL
RBC # BLD: 4.64 M/CU MM (ref 4.6–6.2)
RBC URINE: 23 /HPF (ref 0–3)
SARS-COV-2, NAAT: NOT DETECTED
SEGMENTED NEUTROPHILS ABSOLUTE COUNT: 2.9 K/CU MM
SEGMENTED NEUTROPHILS RELATIVE PERCENT: 79.4 % (ref 36–66)
SODIUM BLD-SCNC: 139 MMOL/L (ref 135–145)
SOURCE: NORMAL
SPECIFIC GRAVITY UA: 1.03 (ref 1–1.03)
SQUAMOUS EPITHELIAL: <1 /HPF
TOTAL IMMATURE NEUTOROPHIL: 0.02 K/CU MM
TOTAL NUCLEATED RBC: 0 K/CU MM
TOTAL PROTEIN: 7.3 GM/DL (ref 6.4–8.2)
TRICHOMONAS: ABNORMAL /HPF
UROBILINOGEN, URINE: NEGATIVE MG/DL (ref 0.2–1)
WBC # BLD: 3.7 K/CU MM (ref 4–10.5)
WBC UA: 1 /HPF (ref 0–2)

## 2021-10-07 PROCEDURE — 71045 X-RAY EXAM CHEST 1 VIEW: CPT

## 2021-10-07 PROCEDURE — 81001 URINALYSIS AUTO W/SCOPE: CPT

## 2021-10-07 PROCEDURE — 6370000000 HC RX 637 (ALT 250 FOR IP): Performed by: EMERGENCY MEDICINE

## 2021-10-07 PROCEDURE — 96374 THER/PROPH/DIAG INJ IV PUSH: CPT

## 2021-10-07 PROCEDURE — 83735 ASSAY OF MAGNESIUM: CPT

## 2021-10-07 PROCEDURE — 6360000002 HC RX W HCPCS: Performed by: EMERGENCY MEDICINE

## 2021-10-07 PROCEDURE — 99283 EMERGENCY DEPT VISIT LOW MDM: CPT

## 2021-10-07 PROCEDURE — 83690 ASSAY OF LIPASE: CPT

## 2021-10-07 PROCEDURE — 6360000004 HC RX CONTRAST MEDICATION: Performed by: EMERGENCY MEDICINE

## 2021-10-07 PROCEDURE — 80053 COMPREHEN METABOLIC PANEL: CPT

## 2021-10-07 PROCEDURE — 2580000003 HC RX 258: Performed by: EMERGENCY MEDICINE

## 2021-10-07 PROCEDURE — 74177 CT ABD & PELVIS W/CONTRAST: CPT

## 2021-10-07 PROCEDURE — 85025 COMPLETE CBC W/AUTO DIFF WBC: CPT

## 2021-10-07 PROCEDURE — 87635 SARS-COV-2 COVID-19 AMP PRB: CPT

## 2021-10-07 RX ORDER — ONDANSETRON 2 MG/ML
4 INJECTION INTRAMUSCULAR; INTRAVENOUS ONCE
Status: COMPLETED | OUTPATIENT
Start: 2021-10-07 | End: 2021-10-07

## 2021-10-07 RX ORDER — 0.9 % SODIUM CHLORIDE 0.9 %
1000 INTRAVENOUS SOLUTION INTRAVENOUS ONCE
Status: COMPLETED | OUTPATIENT
Start: 2021-10-07 | End: 2021-10-07

## 2021-10-07 RX ORDER — PROMETHAZINE HYDROCHLORIDE 25 MG/1
25 TABLET ORAL EVERY 6 HOURS PRN
Qty: 28 TABLET | Refills: 0 | Status: SHIPPED | OUTPATIENT
Start: 2021-10-07 | End: 2021-10-14

## 2021-10-07 RX ORDER — ACETAMINOPHEN 500 MG
1000 TABLET ORAL ONCE
Status: COMPLETED | OUTPATIENT
Start: 2021-10-07 | End: 2021-10-07

## 2021-10-07 RX ORDER — AZITHROMYCIN 250 MG/1
TABLET, FILM COATED ORAL
Qty: 1 PACKET | Refills: 0 | Status: SHIPPED | OUTPATIENT
Start: 2021-10-07 | End: 2021-10-11

## 2021-10-07 RX ORDER — LOPERAMIDE HYDROCHLORIDE 2 MG/1
2 CAPSULE ORAL 4 TIMES DAILY PRN
Qty: 40 CAPSULE | Refills: 0 | Status: SHIPPED | OUTPATIENT
Start: 2021-10-07 | End: 2021-10-17

## 2021-10-07 RX ADMIN — SODIUM CHLORIDE 1000 ML: 9 INJECTION, SOLUTION INTRAVENOUS at 10:10

## 2021-10-07 RX ADMIN — ACETAMINOPHEN 1000 MG: 500 TABLET ORAL at 10:09

## 2021-10-07 RX ADMIN — ONDANSETRON 4 MG: 2 INJECTION INTRAMUSCULAR; INTRAVENOUS at 10:09

## 2021-10-07 RX ADMIN — IOPAMIDOL 80 ML: 755 INJECTION, SOLUTION INTRAVENOUS at 13:06

## 2021-10-07 ASSESSMENT — PAIN SCALES - GENERAL
PAINLEVEL_OUTOF10: 10
PAINLEVEL_OUTOF10: 10

## 2021-10-07 NOTE — ED PROVIDER NOTES
[address] ENCOUNTER      Pt Name: Charles Dodd  MRN: [id number]  [place] 1970  Date of evaluation: 10/7/2021  Provider: Delroy Hassan MD    CHIEF COMPLAINT       Chief Complaint   Patient presents with    Emesis         HISTORY OF PRESENT ILLNESS      Charles Dodd is a 53 y.o. male who presents to the emergency department  for   Chief Complaint   Patient presents with    Emesis       71-year-old male presents complaining of some vomiting and diarrhea. He also complains of some lower abdominal discomfort. Has had symptoms for several days. Denies any remarkable respiratory symptoms. No cough or shortness of breath. Denies any subjective fevers or chills. Emesis is nonbloody nonbilious. Denies history abdominal surgery present any abdominal trauma. He does report having had an episode of emesis in the emergency department. He has not tried any symptomatic measures at home. GCS of 15. He is moving all extremities spontaneously. He has not identified exacerbating or alleviating factors. Nursing Notes, Triage Notes & Vital Signs were reviewed. REVIEW OF SYSTEMS    (2-9 systems for level 4, 10 or more for level 5)     Review of Systems   Constitutional: Negative for chills and fever. HENT: Negative for congestion, rhinorrhea and sore throat. Eyes: Negative for pain and discharge. Respiratory: Negative for cough and shortness of breath. Cardiovascular: Negative for chest pain and palpitations. Gastrointestinal: Positive for abdominal pain, diarrhea, nausea and vomiting. Endocrine: Negative for polydipsia and polyuria. Genitourinary: Negative for dysuria and flank pain. Musculoskeletal: Negative for back pain and neck pain. Skin: Negative for pallor and wound. Neurological: Negative for dizziness, seizures, facial asymmetry, light-headedness, numbness and headaches. tablet by mouth every 8 hours as needed for Pain, Disp-30 tablet, R-0Normal      ondansetron (ZOFRAN ODT) 4 MG disintegrating tablet Take 1 tablet by mouth every 8 hours as needed for Nausea, Disp-15 tablet, R-0Normal             ALLERGIES     Patient has no known allergies. FAMILY HISTORY     History reviewed. No pertinent family history. SOCIAL HISTORY       Social History     Socioeconomic History    Marital status: Single     Spouse name: None    Number of children: None    Years of education: None    Highest education level: None   Occupational History    None   Tobacco Use    Smoking status: Never Smoker   Substance and Sexual Activity    Alcohol use: Not Currently    Drug use: Never    Sexual activity: None   Other Topics Concern    None   Social History Narrative    None     Social Determinants of Health     Financial Resource Strain:     Difficulty of Paying Living Expenses:    Food Insecurity:     Worried About Running Out of Food in the Last Year:     Ran Out of Food in the Last Year:    Transportation Needs:     Lack of Transportation (Medical):      Lack of Transportation (Non-Medical):    Physical Activity:     Days of Exercise per Week:     Minutes of Exercise per Session:    Stress:     Feeling of Stress :    Social Connections:     Frequency of Communication with Friends and Family:     Frequency of Social Gatherings with Friends and Family:     Attends [employer] Services:     Active Member of Clubs or Organizations:     Attends Club or Organization Meetings:     Marital Status:    Intimate Partner Violence:     Fear of Current or Ex-Partner:     Emotionally Abused:     Physically Abused:     Sexually Abused:        SCREENINGS               PHYSICAL EXAM    (up to 7 for level 4, 8 or more for level 5)     ED Triage Vitals   BP Temp Temp Source Pulse Resp SpO2 Height Weight   10/07/21 5154 10/07/21 9332 10/07/21 0849 10/07/21 5142 10/07/21 9056 10/07/21 9561 10/07/21 0832 10/07/21 0832   117/72 100.4 °F (38 °C) Oral 89 16 99 % 6' 2\" (1.88 m) 168 lb (76.2 kg)       Physical Exam  Vitals reviewed. Constitutional:       Appearance: He is not ill-appearing or toxic-appearing. HENT:      Head: Normocephalic and atraumatic. Right Ear: There is no impacted cerumen. Left Ear: There is no impacted cerumen. Nose: Nose normal. No congestion or rhinorrhea. Mouth/Throat:      Mouth: Mucous membranes are dry. Pharynx: No oropharyngeal exudate or posterior oropharyngeal erythema. Eyes:      General:         Right eye: No discharge. Left eye: No discharge. Extraocular Movements: Extraocular movements intact. Pupils: Pupils are equal, round, and reactive to light. Cardiovascular:      Rate and Rhythm: Normal rate. Heart sounds: No friction rub. No gallop. Pulmonary:      Effort: Pulmonary effort is normal. No respiratory distress. Chest:      Chest wall: No tenderness. Abdominal:      Palpations: Abdomen is soft. Tenderness: There is no abdominal tenderness. There is no guarding. Musculoskeletal:         General: No tenderness. Normal range of motion. Cervical back: No tenderness. Right lower leg: No edema. Left lower leg: No edema. Lymphadenopathy:      Cervical: No cervical adenopathy. Skin:     General: Skin is warm. Capillary Refill: Capillary refill takes less than 2 seconds. Findings: No erythema, lesion or rash. Neurological:      General: No focal deficit present. Mental Status: He is alert and oriented to person, place, and time.          DIAGNOSTIC RESULTS     Labs Reviewed   COMPREHENSIVE METABOLIC PANEL W/ REFLEX TO MG FOR LOW K - Abnormal; Notable for the following components:       Result Value    Potassium 3.4 (*)     Glucose 147 (*)     All other components within normal limits   CBC WITH AUTO DIFFERENTIAL - Abnormal; Notable for the following components:    WBC 3.7 (*)     Hemoglobin 12.6 (*)     Hematocrit 38.0 (*)     RDW 11.5 (*)     Segs Relative 79.4 (*)     Lymphocytes % 14.4 (*)     Monocytes % 5.4 (*)     Immature Neutrophil % 0.5 (*)     All other components within normal limits   URINE RT REFLEX TO CULTURE - Abnormal; Notable for the following components:    Ketones, Urine LARGE (*)     RBC, UA 23 (*)     Bacteria, UA OCCASIONAL (*)     Mucus, UA MANY (*)     All other components within normal limits   MAGNESIUM - Abnormal; Notable for the following components:    Magnesium 1.5 (*)     All other components within normal limits   COVID-19, RAPID   LIPASE        RADIOLOGY:     Non-plain film images such as CT, Ultrasound and MRI are read by the radiologist. Plain radiographic images are visualized and preliminarily interpreted by the emergency physician. Interpretation per the Radiologist below, if available at the time of this note:    CT ABDOMEN PELVIS W IV CONTRAST Additional Contrast? None   Final Result   Moderate wall thickening involving portions of the colon may represent an   infectious inflammatory colitis. XR CHEST PORTABLE   Final Result   No acute process.                ED BEDSIDE ULTRASOUND:   Performed by ED Physician Handy Godinez MD       LABS:  Labs Reviewed   COMPREHENSIVE METABOLIC PANEL W/ REFLEX TO MG FOR LOW K - Abnormal; Notable for the following components:       Result Value    Potassium 3.4 (*)     Glucose 147 (*)     All other components within normal limits   CBC WITH AUTO DIFFERENTIAL - Abnormal; Notable for the following components:    WBC 3.7 (*)     Hemoglobin 12.6 (*)     Hematocrit 38.0 (*)     RDW 11.5 (*)     Segs Relative 79.4 (*)     Lymphocytes % 14.4 (*)     Monocytes % 5.4 (*)     Immature Neutrophil % 0.5 (*)     All other components within normal limits   URINE RT REFLEX TO CULTURE - Abnormal; Notable for the following components:    Ketones, Urine LARGE (*)     RBC, UA 23 (*)     Bacteria, UA OCCASIONAL (*)     Mucus, UA MANY (*)     All other components within normal limits   MAGNESIUM - Abnormal; Notable for the following components:    Magnesium 1.5 (*)     All other components within normal limits   COVID-19, RAPID   LIPASE       All other labs were within normal range or not returned as of this dictation. EMERGENCY DEPARTMENT COURSE and DIFFERENTIAL DIAGNOSIS/MDM:   Vitals:    Vitals:    10/07/21 0832 10/07/21 0849 10/07/21 1515   BP: 117/72  118/70   Pulse: 89  84   Resp: 16  16   Temp: 100.4 °F (38 °C)  98.9 °F (37.2 °C)   TempSrc:  Oral Oral   SpO2: 99%  98%   Weight: 168 lb (76.2 kg)     Height: 6' 2\" (1.88 m)             MDM  Number of Diagnoses or Management Options  Colitis  Nausea vomiting and diarrhea  Diagnosis management comments: 71-year-old male presents with nausea, vomiting and diarrhea. Endorses several days symptoms. Denies any remarkable respiratory symptoms. He has not been on any antibiotics recently. No atypical exposures like travel or atypical food exposures. Denies any remarkable abdominal surgical history. He does endorse an episode of emesis in the emergency department. Presents mildly febrile. Vitals otherwise unremarkable. No signs of respiratory distress. Labs and imaging are obtained. Is up several nonacute. White count 3.7. His urinalysis does show some ketones. CT abdomen is obtained and is concerning with possible colitis. His chest x-ray is nonacute. His Covid-19 test is negative. He treated symptomatically with fluids, nausea medicine in the emergency department. Reassessment of symptoms are improved. He did tolerate an oral challenge in the emergency department. He will be discharged home. I will prescribe an empiric course of azithromycin since he is febrile in the emergency department. He will follow palpation. He is given strict return instructions for worsening concerning symptoms. He is discharged in stable condition.        Amount and/or Complexity medication that was prescribed. Major potential reactions and medication interactions were discussed. The Patient understands that there are numerous possible adverse reactions not covered. The patient was also instructed to arrange follow-up with his or her primary care provider for review of any pending labwork or incidental findings on any radiology results that were obtained. All efforts were made to discuss any incidental findings that require further monitoring. Controlled Substances Monitoring:     No flowsheet data found.     (Please note that portions of this note were completed with a voice recognition program.  Efforts were made to edit the dictations but occasionally words are mis-transcribed.)    Gus Lyn MD (electronically signed)  Attending Emergency Physician           Gus Lyn MD  10/11/21 1349       Gus Lyn MD  10/11/21 8811

## 2021-10-08 ASSESSMENT — ENCOUNTER SYMPTOMS
COUGH: 0
NAUSEA: 1
EYE PAIN: 0
RHINORRHEA: 0
DIARRHEA: 1
EYE DISCHARGE: 0
SHORTNESS OF BREATH: 0
VOMITING: 1
SORE THROAT: 0
BACK PAIN: 0
ABDOMINAL PAIN: 1

## 2021-10-10 ENCOUNTER — HOSPITAL ENCOUNTER (EMERGENCY)
Age: 51
Discharge: HOME | End: 2021-10-10
Attending: EMERGENCY MEDICINE
Payer: COMMERCIAL

## 2021-10-10 VITALS
OXYGEN SATURATION: 99 % | SYSTOLIC BLOOD PRESSURE: 122 MMHG | HEIGHT: 74 IN | WEIGHT: 170 LBS | TEMPERATURE: 98.5 F | RESPIRATION RATE: 16 BRPM | BODY MASS INDEX: 21.82 KG/M2 | HEART RATE: 75 BPM | DIASTOLIC BLOOD PRESSURE: 96 MMHG

## 2021-10-10 DIAGNOSIS — F19.10 POLYSUBSTANCE ABUSE (HCC): ICD-10-CM

## 2021-10-10 DIAGNOSIS — R11.2 NAUSEA AND VOMITING, INTRACTABILITY OF VOMITING NOT SPECIFIED, UNSPECIFIED VOMITING TYPE: Primary | ICD-10-CM

## 2021-10-10 PROCEDURE — 99284 EMERGENCY DEPT VISIT MOD MDM: CPT

## 2021-10-10 PROCEDURE — 6370000000 HC RX 637 (ALT 250 FOR IP): Performed by: EMERGENCY MEDICINE

## 2021-10-10 RX ORDER — ONDANSETRON 4 MG/1
4 TABLET, ORALLY DISINTEGRATING ORAL ONCE
Status: COMPLETED | OUTPATIENT
Start: 2021-10-10 | End: 2021-10-10

## 2021-10-10 RX ORDER — ONDANSETRON 4 MG/1
4 TABLET, ORALLY DISINTEGRATING ORAL EVERY 8 HOURS PRN
Qty: 15 TABLET | Refills: 0 | Status: SHIPPED | OUTPATIENT
Start: 2021-10-10 | End: 2022-01-03

## 2021-10-10 RX ADMIN — ONDANSETRON 4 MG: 4 TABLET, ORALLY DISINTEGRATING ORAL at 12:35

## 2021-10-10 NOTE — ED PROVIDER NOTES
Triage Chief Complaint:   Emesis and Nausea    HPI:  Demario Huston is a 53 y.o. male that presents with nausea and vomiting. He states that he is having nausea and vomiting, he thinks related to his drug abuse. States that he has been using heroin and other drugs including methamphetamines and wants to get off of them. He states that once he goes and gets the drug his nausea and vomiting get better for period time and he feels better. He states then as soon as it wears off he feels the same. He denies any more diarrhea. Denies abdominal pain. Denies body aches. He states he wants to get clean and get off of the drugs. He states he has gotten cleaned before and when he did it was because he went to senior living. ROS:  General:  No fevers, no chills  Eyes:   No vision change, no discharge  ENT:  No sore throat, no nasal congestion  Cardiovascular:  No chest pain  Respiratory:  No shortness of breath or cough  Gastrointestinal:  No pain, + nausea,  vomiting, denies diarrhea  Musculoskeletal:  No muscle pain, no joint pain  Skin:  No rash, color change  Neurologic:   no headache, focal weakness  Genitourinary:  No dysuria, flank pain  Extremities:  no edema, no pain    History reviewed. No pertinent past medical history. Past Surgical History:   Procedure Laterality Date    INCISION AND DRAINAGE Left 4/7/2021    ARM INCISION AND DRAINAGE performed by Regla Link MD at [address] [place] [address] Left 5/4/2021    LEFT ARM SKIN GRAFT SPLIT THICKNESS performed by Regla Link MD at [place] [address] reviewed. No pertinent family history.   Social History     Socioeconomic History    Marital status: Single     Spouse name: Not on file    Number of children: Not on file    Years of education: Not on file    Highest education level: Not on file   Occupational History    Not on file   Tobacco Use    Smoking status: Never Smoker   Substance and Sexual Activity    Alcohol use: Not Currently    Drug use: Never    Sexual activity: Not on file   Other Topics Concern    Not on file   Social History Narrative    Not on file     Social Determinants of Health     Financial Resource Strain:     Difficulty of Paying Living Expenses:    Food Insecurity:     Worried About Running Out of Food in the Last Year:     [address] in the Last Year:    Transportation Needs:     Lack of Transportation (Medical):      Lack of Transportation (Non-Medical):    Physical Activity:     Days of Exercise per Week:     Minutes of Exercise per Session:    Stress:     Feeling of Stress :    Social Connections:     Frequency of Communication with Friends and Family:     Frequency of Social Gatherings with Friends and Family:     Attends Confucianism Services:     Active Member of Clubs or Organizations:     Attends Club or Organization Meetings:     Marital Status:    Intimate Partner Violence:     Fear of Current or Ex-Partner:     Emotionally Abused:     Physically Abused:     Sexually Abused:      Current Facility-Administered Medications   Medication Dose Route Frequency Provider Last Rate Last Admin    ondansetron (ZOFRAN-ODT) disintegrating tablet 4 mg  4 mg Oral Once Eleanora Ripper, DO         Current Outpatient Medications   Medication Sig Dispense Refill    azithromycin (ZITHROMAX Z-ROOPA) 250 MG tablet Take 2 tablets (500 mg) on Day 1, and then take 1 tablet (250 mg) on days 2 through 5. 1 packet 0    promethazine (PHENERGAN) 25 MG tablet Take 1 tablet by mouth every 6 hours as needed for Nausea 28 tablet 0    loperamide (RA ANTI-DIARRHEAL) 2 MG capsule Take 1 capsule by mouth 4 times daily as needed for Diarrhea 40 capsule 0    Pseudoephedrine-DM-GG 60- MG TABS Take 1 tablet by mouth every 6-8 hours as needed (for cough and nasal congestion) 9 tablet 0    Multiple Vitamins-Minerals (THERAPEUTIC MULTIVITAMIN-MINERALS) tablet Take 1 tablet by mouth daily      ibuprofen (ADVIL;MOTRIN) 800 MG tablet Take 1 tablet by mouth every 8 hours as needed for Pain 30 tablet 0    ondansetron (ZOFRAN ODT) 4 MG disintegrating tablet Take 1 tablet by mouth every 8 hours as needed for Nausea 15 tablet 0     No Known Allergies    Nursing Notes Reviewed    Physical Exam:  ED Triage Vitals [10/10/21 1149]   Enc Vitals Group      BP (!) 122/96      Pulse 75      Resp 16      Temp 98.5 °F (36.9 °C)      Temp Source Oral      SpO2 99 %      Weight 170 lb (77.1 kg)      Height 6' 2\" (1.88 m)      Head Circumference       Peak Flow       Pain Score       Pain Loc       Pain Edu? Excl. in [address]? General appearance:  Awake, alert. No acute distress. Skin:  Warm. Dry. No rash   Eye:  PERRL. Extraocular movements intact. Ears, nose, mouth and throat:  Oral mucosa moist, normal posterior pharynx   Neck:  Supple without rigidity. Extremity:   Normal ROM, no edema. Heart:  Regular rate and rhythm without murmurs. Respiratory: Respirations nonlabored. Clear to auscultation bilaterally. Abdominal:  Normal bowel sounds. Soft. Non tender to palpation. Non distended. No guarding or rebound. Back:  No CVA tenderness to palpation           Neurological:  Alert and oriented times 3. No focal deficits. I have reviewed and interpreted all of the currently available lab results from this visit (if applicable):  No results found for this visit on 10/10/21. EKG (if obtained): (All EKG's are interpreted by myself in the absence of a cardiologist)    Chart review shows recent radiographs:  CT ABDOMEN PELVIS W IV CONTRAST Additional Contrast? None    Result Date: 10/7/2021  EXAMINATION: CT OF THE ABDOMEN AND PELVIS WITH CONTRAST 10/7/2021 12:44 pm TECHNIQUE: CT of the abdomen and pelvis was performed with the administration of intravenous contrast. Multiplanar reformatted images are provided for review.  Dose modulation, iterative reconstruction, and/or weight based adjustment of the mA/kV was utilized to reduce the radiation dose to as low as reasonably achievable. COMPARISON: None. HISTORY: ORDERING SYSTEM PROVIDED HISTORY: lower abdominal pain, vomiting, fever, concern for intraabdominal process TECHNOLOGIST PROVIDED HISTORY: Additional Contrast?->None Reason for exam:->lower abdominal pain, vomiting, fever, concern for intraabdominal process Decision Support Exception - unselect if not a suspected or confirmed emergency medical condition->Emergency Medical Condition (MA) Reason for Exam: lower abdominal pain, vomiting, fever, concern for intraabdominal process FINDINGS: Lower Chest: Visualized portions of the lower thorax are unremarkable. Organs: Liver, spleen pancreas, adrenal glands, and kidneys appear unremarkable. No radiodense gallstones. GI/Bowel: No bowel obstruction. Normal appendix. Moderate wall thickening involving portions of the colon may represent an infectious inflammatory colitis. Pelvis: Urinary bladder decompressed difficult to evaluate. Peritoneum/Retroperitoneum: No free air, fluid, or lymphadenopathy. Bones/Soft Tissues: Acute osseous. Moderate wall thickening involving portions of the colon may represent an infectious inflammatory colitis. XR CHEST PORTABLE    Result Date: 10/7/2021  EXAMINATION: ONE XRAY VIEW OF THE CHEST 10/7/2021 10:26 am COMPARISON: None. HISTORY: ORDERING SYSTEM PROVIDED HISTORY: fever, concern for infectious process TECHNOLOGIST PROVIDED HISTORY: Reason for exam:->fever, concern for infectious process Reason for Exam: fever, concern for infectious process FINDINGS: The heart is normal in size. The lungs are clear with no focal consolidation, pleural effusion, or pneumothorax. Are intact. No acute process. MDM:  Patient presented with nausea vomiting. Abdominal exam is nonsurgical.  Patient is afebrile and nontoxic-appearing. He had no vomiting in the emergency department. He told me his actual reason for being here was his drug addiction problem.   He desires to get help, states that his symptoms get worse when he is not using the drug, his nausea and vomiting gets better when he is using the drug. States that he wants to get help and get clean again. He was evaluated by case management, they spoke with Yasmine Dawson a representative is going to call the patient for seeing in the morning. Patient understands that he is going to go to [facility] tomorrow morning. At this point will discharge home with instructions to follow-up with [facility] tomorrow.       Clinical Impression:  Nausea and vomiting, Polysubstance abuse       (Please note that portions of this note may have been completed with a voice recognition program. Efforts were made to edit the dictations but occasionally words are mis-transcribed.)      Marleny Love DO  10/10/21 2139

## 2021-10-10 NOTE — ED NOTES
Pt states he is less nauseated, juice and ice water given for po challenge. Pt awaiting [facility] case management per Dr Hanna Fletcher.  Pt updated     Dot De Oliveira RN  10/10/21 3499

## 2021-10-10 NOTE — CARE COORDINATION
CM received consult on patient for substance abuse assistance. Patient reported that he has been taking heroin and other drugs and would like assistance with stopping. CM went to patients' room. Patient shared the above with CM. CM gave patient [facility] [place]. Patient very interested in going inpatient, but patient is about to be discharged -- per Dr. Nata Lee. CM instructed patient to go to [facility] tomorrow morning to be assessed for inpatient. CM asked patient if he would like CM to give Diana Garber from [facility] [address] [facility], [address] [place] [address] or call Sami Vasquez [phone]. Patient reported that he would like CM to call Sami Vasquez and request Sami Vasquez to call patient on his cell phone of: [phone]. CM also shared with patient that patient could go to [facility] early in AM for assessment and possible admission. Patient more interested at this time into going to [facility] inpatient. Patient plan after being discharged is to go to [facility] tomorrow morning for inpatient treatment. CM called Diana Garber from [facility] and requested Sami Vasquez to call patient on his cell phone of: [phone]. Left VM message. No further needs at this time. 15:00 RN came and requested CM to speak to patient b/c he had a few questions about [facility] and patient also needed transportation home. CM went to see patient. Patient wanted to review time of going to [facility] and see if ED provide rides there tomorrow. CM reported that ED does not provide rides from home to [place], but perhaps patient will speak to Diana Garber from [place] and Sami Vasquez can assist patient with transportation over there. Patient very interested and appears very motivated to start inpatient treatment at [facility]. CM also took patients' transportation information and called [employer] @ [phone].  Patient to be transported now that he is discharged. Invoice complete.

## 2021-10-15 ENCOUNTER — HOSPITAL ENCOUNTER (OUTPATIENT)
Age: 51
Discharge: HOME | End: 2021-10-15
Payer: COMMERCIAL

## 2021-10-15 LAB
ALBUMIN SERPL-MCNC: 4.3 GM/DL (ref 3.4–5)
ALP BLD-CCNC: 77 IU/L (ref 40–129)
ALT SERPL-CCNC: 11 U/L (ref 10–40)
AST SERPL-CCNC: 11 IU/L (ref 15–37)
BILIRUB SERPL-MCNC: 0.6 MG/DL (ref 0–1)
BILIRUBIN DIRECT: 0.2 MG/DL (ref 0–0.3)
BILIRUBIN, INDIRECT: 0.4 MG/DL (ref 0–0.7)
HEPATITIS B SURFACE ANTIGEN: NON REACTIVE
HEPATITIS C ANTIBODY: NON REACTIVE
TOTAL PROTEIN: 6.9 GM/DL (ref 6.4–8.2)

## 2021-10-15 PROCEDURE — 80076 HEPATIC FUNCTION PANEL: CPT

## 2021-10-15 PROCEDURE — 87389 HIV-1 AG W/HIV-1&-2 AB AG IA: CPT

## 2021-10-15 PROCEDURE — 86708 HEPATITIS A ANTIBODY: CPT

## 2021-10-15 PROCEDURE — 87340 HEPATITIS B SURFACE AG IA: CPT

## 2021-10-15 PROCEDURE — 36415 COLL VENOUS BLD VENIPUNCTURE: CPT

## 2021-10-15 PROCEDURE — 86592 SYPHILIS TEST NON-TREP QUAL: CPT

## 2021-10-15 PROCEDURE — 86803 HEPATITIS C AB TEST: CPT

## 2021-10-16 LAB
HIV SCREEN: NON REACTIVE
RPR: NON REACTIVE

## 2021-10-17 LAB — HAV AB SERPL IA-ACNC: NEGATIVE

## 2021-11-15 ENCOUNTER — OFFICE VISIT (OUTPATIENT)
Dept: FAMILY MEDICINE CLINIC | Age: 51
End: 2021-11-15
Payer: COMMERCIAL

## 2021-11-15 VITALS
OXYGEN SATURATION: 97 % | HEART RATE: 76 BPM | WEIGHT: 197 LBS | BODY MASS INDEX: 25.28 KG/M2 | HEIGHT: 74 IN | SYSTOLIC BLOOD PRESSURE: 122 MMHG | DIASTOLIC BLOOD PRESSURE: 78 MMHG

## 2021-11-15 DIAGNOSIS — R73.9 ELEVATED SERUM GLUCOSE: ICD-10-CM

## 2021-11-15 DIAGNOSIS — Z20.2 STD EXPOSURE: ICD-10-CM

## 2021-11-15 DIAGNOSIS — R39.9 URINARY TRACT INFECTION SYMPTOMS: ICD-10-CM

## 2021-11-15 DIAGNOSIS — Z76.89 ENCOUNTER TO ESTABLISH CARE: Primary | ICD-10-CM

## 2021-11-15 LAB
BILIRUBIN, POC: NORMAL
BLOOD URINE, POC: NORMAL
CLARITY, POC: CLEAR
COLOR, POC: YELLOW
GLUCOSE URINE, POC: NORMAL
HBA1C MFR BLD: 5.1 %
KETONES, POC: NORMAL
LEUKOCYTE EST, POC: NORMAL
NITRITE, POC: NORMAL
PH, POC: 6
PROTEIN, POC: NORMAL
SPECIFIC GRAVITY, POC: >1.03
UROBILINOGEN, POC: 0.2

## 2021-11-15 PROCEDURE — 1036F TOBACCO NON-USER: CPT | Performed by: NURSE PRACTITIONER

## 2021-11-15 PROCEDURE — 81002 URINALYSIS NONAUTO W/O SCOPE: CPT | Performed by: NURSE PRACTITIONER

## 2021-11-15 PROCEDURE — 83036 HEMOGLOBIN GLYCOSYLATED A1C: CPT | Performed by: NURSE PRACTITIONER

## 2021-11-15 PROCEDURE — G8484 FLU IMMUNIZE NO ADMIN: HCPCS | Performed by: NURSE PRACTITIONER

## 2021-11-15 PROCEDURE — G8427 DOCREV CUR MEDS BY ELIG CLIN: HCPCS | Performed by: NURSE PRACTITIONER

## 2021-11-15 PROCEDURE — G8419 CALC BMI OUT NRM PARAM NOF/U: HCPCS | Performed by: NURSE PRACTITIONER

## 2021-11-15 PROCEDURE — 99203 OFFICE O/P NEW LOW 30 MIN: CPT | Performed by: NURSE PRACTITIONER

## 2021-11-15 ASSESSMENT — PATIENT HEALTH QUESTIONNAIRE - PHQ9
2. FEELING DOWN, DEPRESSED OR HOPELESS: 0
SUM OF ALL RESPONSES TO PHQ QUESTIONS 1-9: 0
SUM OF ALL RESPONSES TO PHQ9 QUESTIONS 1 & 2: 0
SUM OF ALL RESPONSES TO PHQ QUESTIONS 1-9: 0
1. LITTLE INTEREST OR PLEASURE IN DOING THINGS: 0
SUM OF ALL RESPONSES TO PHQ QUESTIONS 1-9: 0

## 2021-11-15 ASSESSMENT — ENCOUNTER SYMPTOMS
NAUSEA: 0
TROUBLE SWALLOWING: 0
ABDOMINAL PAIN: 1
WHEEZING: 0
COUGH: 0
CHEST TIGHTNESS: 0
SHORTNESS OF BREATH: 0
VOMITING: 0
SORE THROAT: 0
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
## 2021-11-17 DIAGNOSIS — Z12.11 SCREENING FOR COLON CANCER: ICD-10-CM

## 2021-11-17 DIAGNOSIS — Z12.11 SCREENING FOR COLON CANCER: Primary | ICD-10-CM

## 2021-11-17 LAB
C. TRACHOMATIS DNA ,URINE: NEGATIVE
CONTROL: NORMAL
HEMOCCULT STL QL: NORMAL
N. GONORRHOEAE DNA, URINE: NEGATIVE

## 2021-11-17 PROCEDURE — 82274 ASSAY TEST FOR BLOOD FECAL: CPT | Performed by: NURSE PRACTITIONER

## 2022-01-03 ENCOUNTER — OFFICE VISIT (OUTPATIENT)
Dept: FAMILY MEDICINE CLINIC | Age: 52
End: 2022-01-03
Payer: COMMERCIAL

## 2022-01-03 VITALS
BODY MASS INDEX: 26.96 KG/M2 | WEIGHT: 210 LBS | HEART RATE: 74 BPM | RESPIRATION RATE: 16 BRPM | DIASTOLIC BLOOD PRESSURE: 88 MMHG | OXYGEN SATURATION: 97 % | SYSTOLIC BLOOD PRESSURE: 132 MMHG

## 2022-01-03 DIAGNOSIS — D17.9 LIPOMA, UNSPECIFIED SITE: ICD-10-CM

## 2022-01-03 DIAGNOSIS — B35.1 FUNGAL INFECTION OF TOENAIL: Primary | ICD-10-CM

## 2022-01-03 PROCEDURE — 99213 OFFICE O/P EST LOW 20 MIN: CPT | Performed by: NURSE PRACTITIONER

## 2022-01-03 ASSESSMENT — ENCOUNTER SYMPTOMS
SHORTNESS OF BREATH: 0
CHEST TIGHTNESS: 0
COUGH: 0
GASTROINTESTINAL NEGATIVE: 1
WHEEZING: 0

## 2022-01-03 ASSESSMENT — PATIENT HEALTH QUESTIONNAIRE - PHQ9
SUM OF ALL RESPONSES TO PHQ9 QUESTIONS 1 & 2: 0
SUM OF ALL RESPONSES TO PHQ QUESTIONS 1-9: 0
SUM OF ALL RESPONSES TO PHQ QUESTIONS 1-9: 0
2. FEELING DOWN, DEPRESSED OR HOPELESS: 0
1. LITTLE INTEREST OR PLEASURE IN DOING THINGS: 0
SUM OF ALL RESPONSES TO PHQ QUESTIONS 1-9: 0
SUM OF ALL RESPONSES TO PHQ QUESTIONS 1-9: 0

## 2022-01-03 NOTE — PROGRESS NOTES
Jamel Huang  1970  49 y.o. SUBJECT KSENIA:    Chief Complaint   Patient presents with    Lipoma     Possible lipoma on right hip. Has noticed it is getting bigger.  Nail Problem     right foot toenail fungus for quite sometime.  Sexually Transmitted Diseases     Patient would like to be tested for STDs while he is here. States that he is asymptomatic. Cindi Chu is a 49year old male who is in for follow up. He states he continues to notice the lump on his right hip which he thinks is getting bigger. He denies discomfort in the area. He is concerned with nail fungus that has caused thickening of the right great toe and second toe on right. He states the nails are real thick and hard to trim. He states he has tried over the counter products with out any success. He states he was on oral medication many years but did not complete the treatment. He is also requesting STD testing. However, he had these studies done in November. He denies sexual contact and denies any symptoms of concern. Current Outpatient Medications on File Prior to Visit   Medication Sig Dispense Refill    Multiple Vitamins-Minerals (THERAPEUTIC MULTIVITAMIN-MINERALS) tablet Take 1 tablet by mouth daily       No current facility-administered medications on file prior to visit. No past medical history on file.   Past Surgical History:   Procedure Laterality Date    INCISION AND DRAINAGE Left 4/7/2021    ARM INCISION AND DRAINAGE performed by Lul Lopez MD at [place] [id number] SKIN GRAFT Left 5/4/2021    LEFT ARM SKIN GRAFT SPLIT THICKNESS performed by Lul Lopez MD at [address] [place] [address] OR     Family History   Problem Relation Age of Onset    Substance Abuse Mother     Alcohol Abuse Mother     Cancer Maternal Grandmother     Alcohol Abuse Maternal Grandmother      Social History     Socioeconomic History    Marital status: Single     Spouse name: Not on file    Number of children: Not on file    Years of education: Not on file    Highest education level: Not on file   Occupational History    Not on file   Tobacco Use    Smoking status: Never Smoker    Smokeless tobacco: Never Used   Vaping Use    Vaping Use: Never used   Substance and Sexual Activity    Alcohol use: Not Currently    Drug use: Not Currently    Sexual activity: Yes     Partners: Female   Other Topics Concern    Not on file   Social History Narrative    Not on file     Social Determinants of Health     Financial Resource Strain:     Difficulty of Paying Living Expenses: Not on file   Food Insecurity:     Worried About Running Out of Food in the Last Year: Not on file    John of Food in the Last Year: Not on file   Transportation Needs:     Lack of Transportation (Medical): Not on file    Lack of Transportation (Non-Medical): Not on file   Physical Activity:     Days of Exercise per Week: Not on file    Minutes of Exercise per Session: Not on file   Stress:     Feeling of Stress : Not on file   Social Connections:     Frequency of Communication with Friends and Family: Not on file    Frequency of Social Gatherings with Friends and Family: Not on file    Attends Taoism Services: Not on file    Active Member of [address] [place] [address] or Organizations: Not on file    Attends Club or Organization Meetings: Not on file    Marital Status: Not on file   Intimate Partner Violence:     Fear of Current or Ex-Partner: Not on file    Emotionally Abused: Not on file    Physically Abused: Not on file    Sexually Abused: Not on file   Housing Stability:     Unable to Pay for Housing in the Last Year: Not on file    Number of [place] in the Last Year: Not on file    Unstable Housing in the Last Year: Not on file       Review of Systems   Constitutional: Negative for activity change, appetite change, chills, diaphoresis, fatigue, fever and unexpected weight change.    Respiratory: Negative for cough, chest tightness, shortness of breath and wheezing. Cardiovascular: Negative for chest pain and palpitations. Gastrointestinal: Negative. Genitourinary: Negative. Skin: Negative. Lipoma, right hip area. Neurological: Negative. OBJECTIVE:     /88   Pulse 74   Resp 16   Wt 210 lb (95.3 kg)   SpO2 97%   BMI 26.96 kg/m²     Physical Exam  Vitals reviewed. Constitutional:       General: He is not in acute distress. Appearance: Normal appearance. He is well-developed. He is not ill-appearing or diaphoretic. HENT:      Head: Normocephalic and atraumatic. Right Ear: External ear normal.      Left Ear: External ear normal.      Nose: Nose normal.   Eyes:      General: No scleral icterus. Conjunctiva/sclera: Conjunctivae normal.      Pupils: Pupils are equal, round, and reactive to light. Cardiovascular:      Rate and Rhythm: Normal rate and regular rhythm. Heart sounds: Normal heart sounds. No murmur heard. No friction rub. No gallop. Pulmonary:      Effort: Pulmonary effort is normal. No respiratory distress. Breath sounds: Normal breath sounds. No wheezing. Chest:      Chest wall: No tenderness. Musculoskeletal:         General: Normal range of motion. Cervical back: Normal range of motion and neck supple. Skin:     General: Skin is warm and dry. Findings: Lesion (lipoma, right hip, nontender) present. Comments: Lipoma measures 7 cm in maximum diameter, nontender to palpation, no redness, no drainage. Neurological:      Mental Status: He is alert and oriented to person, place, and time. Psychiatric:         Behavior: Behavior normal.         Thought Content: Thought content normal.         Judgment: Judgment normal.         No results found for requested labs within last 30 days.      Hemoglobin A1C (%)   Date Value   11/15/2021 5.1       Lab Results   Component Value Date    WBC 3.7 10/07/2021    WBC 4.6 04/06/2021    WBC 5.2 04/06/2021    HGB 12.6 10/07/2021

## 2022-06-05 ENCOUNTER — HOSPITAL ENCOUNTER (EMERGENCY)
Age: 52
Discharge: HOME | End: 2022-06-05
Attending: EMERGENCY MEDICINE
Payer: COMMERCIAL

## 2022-06-05 VITALS
DIASTOLIC BLOOD PRESSURE: 92 MMHG | HEIGHT: 74 IN | HEART RATE: 70 BPM | WEIGHT: 195 LBS | OXYGEN SATURATION: 97 % | BODY MASS INDEX: 25.03 KG/M2 | SYSTOLIC BLOOD PRESSURE: 152 MMHG | RESPIRATION RATE: 15 BRPM | TEMPERATURE: 98.6 F

## 2022-06-05 DIAGNOSIS — R74.8 ELEVATED LIPASE: ICD-10-CM

## 2022-06-05 DIAGNOSIS — R11.2 NON-INTRACTABLE VOMITING WITH NAUSEA, UNSPECIFIED VOMITING TYPE: Primary | ICD-10-CM

## 2022-06-05 LAB
ALBUMIN SERPL-MCNC: 4.3 GM/DL (ref 3.4–5)
ALP BLD-CCNC: 87 IU/L (ref 40–128)
ALT SERPL-CCNC: 17 U/L (ref 10–40)
ANION GAP SERPL CALCULATED.3IONS-SCNC: 15 MMOL/L (ref 4–16)
AST SERPL-CCNC: 14 IU/L (ref 15–37)
BASOPHILS ABSOLUTE: 0 K/CU MM
BASOPHILS RELATIVE PERCENT: 0.4 % (ref 0–1)
BILIRUB SERPL-MCNC: 1.3 MG/DL (ref 0–1)
BUN BLDV-MCNC: 24 MG/DL (ref 6–23)
CALCIUM SERPL-MCNC: 8.7 MG/DL (ref 8.3–10.6)
CHLORIDE BLD-SCNC: 100 MMOL/L (ref 99–110)
CO2: 22 MMOL/L (ref 21–32)
CREAT SERPL-MCNC: 1.2 MG/DL (ref 0.9–1.3)
DIFFERENTIAL TYPE: ABNORMAL
EOSINOPHILS ABSOLUTE: 0 K/CU MM
EOSINOPHILS RELATIVE PERCENT: 0.2 % (ref 0–3)
GFR AFRICAN AMERICAN: >60 ML/MIN/1.73M2
GFR NON-AFRICAN AMERICAN: >60 ML/MIN/1.73M2
GLUCOSE BLD-MCNC: 106 MG/DL (ref 70–99)
HCT VFR BLD CALC: 50.7 % (ref 42–52)
HEMOGLOBIN: 16.4 GM/DL (ref 13.5–18)
IMMATURE NEUTROPHIL %: 0.2 % (ref 0–0.43)
LIPASE: 107 IU/L (ref 13–60)
LYMPHOCYTES ABSOLUTE: 1.6 K/CU MM
LYMPHOCYTES RELATIVE PERCENT: 33.8 % (ref 24–44)
MCH RBC QN AUTO: 26.8 PG (ref 27–31)
MCHC RBC AUTO-ENTMCNC: 32.3 % (ref 32–36)
MCV RBC AUTO: 82.7 FL (ref 78–100)
MONOCYTES ABSOLUTE: 0.7 K/CU MM
MONOCYTES RELATIVE PERCENT: 13.6 % (ref 0–4)
NUCLEATED RBC %: 0 %
PDW BLD-RTO: 11.4 % (ref 11.7–14.9)
PLATELET # BLD: 266 K/CU MM (ref 140–440)
PMV BLD AUTO: 10.8 FL (ref 7.5–11.1)
POTASSIUM SERPL-SCNC: 3.6 MMOL/L (ref 3.5–5.1)
RBC # BLD: 6.13 M/CU MM (ref 4.6–6.2)
SEGMENTED NEUTROPHILS ABSOLUTE COUNT: 2.5 K/CU MM
SEGMENTED NEUTROPHILS RELATIVE PERCENT: 51.8 % (ref 36–66)
SODIUM BLD-SCNC: 137 MMOL/L (ref 135–145)
TOTAL IMMATURE NEUTOROPHIL: 0.01 K/CU MM
TOTAL NUCLEATED RBC: 0 K/CU MM
TOTAL PROTEIN: 7.9 GM/DL (ref 6.4–8.2)
TROPONIN T: <0.01 NG/ML
WBC # BLD: 4.9 K/CU MM (ref 4–10.5)

## 2022-06-05 PROCEDURE — 99284 EMERGENCY DEPT VISIT MOD MDM: CPT

## 2022-06-05 PROCEDURE — 93005 ELECTROCARDIOGRAM TRACING: CPT | Performed by: PHYSICIAN ASSISTANT

## 2022-06-05 PROCEDURE — 80053 COMPREHEN METABOLIC PANEL: CPT

## 2022-06-05 PROCEDURE — 96374 THER/PROPH/DIAG INJ IV PUSH: CPT

## 2022-06-05 PROCEDURE — 85025 COMPLETE CBC W/AUTO DIFF WBC: CPT

## 2022-06-05 PROCEDURE — 84484 ASSAY OF TROPONIN QUANT: CPT

## 2022-06-05 PROCEDURE — 83690 ASSAY OF LIPASE: CPT

## 2022-06-05 PROCEDURE — 2580000003 HC RX 258: Performed by: PHYSICIAN ASSISTANT

## 2022-06-05 PROCEDURE — 6360000002 HC RX W HCPCS: Performed by: PHYSICIAN ASSISTANT

## 2022-06-05 RX ORDER — 0.9 % SODIUM CHLORIDE 0.9 %
1000 INTRAVENOUS SOLUTION INTRAVENOUS ONCE
Status: COMPLETED | OUTPATIENT
Start: 2022-06-05 | End: 2022-06-05

## 2022-06-05 RX ORDER — ONDANSETRON 2 MG/ML
4 INJECTION INTRAMUSCULAR; INTRAVENOUS ONCE
Status: COMPLETED | OUTPATIENT
Start: 2022-06-05 | End: 2022-06-05

## 2022-06-05 RX ADMIN — ONDANSETRON 4 MG: 2 INJECTION INTRAMUSCULAR; INTRAVENOUS at 04:48

## 2022-06-05 RX ADMIN — SODIUM CHLORIDE 1000 ML: 9 INJECTION, SOLUTION INTRAVENOUS at 04:48

## 2022-06-05 ASSESSMENT — PAIN SCALES - GENERAL: PAINLEVEL_OUTOF10: 0

## 2022-06-05 ASSESSMENT — PAIN - FUNCTIONAL ASSESSMENT: PAIN_FUNCTIONAL_ASSESSMENT: 0-10

## 2022-06-05 NOTE — ED PROVIDER NOTES
EMERGENCY DEPARTMENT ENCOUNTER      PCP: AILEEN Rivera CNP    CHIEF COMPLAINT    Chief Complaint   Patient presents with    Emesis    Fatigue     5 days      Patient staffed with supervising physician Dr. Hallie Gage    HPI    David Rodriguez is a 49 y.o. male who presents with nausea and vomiting for the past 5 days. Patient states he has history of opiate use and has been in rehab for the past week. Patient states they have tried giving him medications for nausea and vomiting however he is continuing to vomit. Patient states he has associated lightheadedness, fatigue. Patient states when he eats or drinks he vomits shortly afterwards. Patient has associate abdominal pain especially with vomiting. Patient denies chest pain, shortness of breath. Patient denies fever, urinary symptoms. REVIEW OF SYSTEMS    Constitutional:  Denies fever  HENT:  Denies sore throat or ear pain   Cardiovascular:  Denies chest pain  Respiratory:  Denies cough or shortness of breath   GI:  See HPI above  : No hematuria or dysuria. Musculoskeletal:  No pain or swelling of extremities. Skin:  Denies rash  Neurologic:  Denies focal weakness or sensory changes   Lymphatic:  Denies swollen glands     All other review of systems are negative  See HPI and nursing notes for additional information     [address]    History reviewed. No pertinent past medical history.   Past Surgical History:   Procedure Laterality Date    INCISION AND DRAINAGE Left 4/7/2021    ARM INCISION AND DRAINAGE performed by Malu Pinto MD at [address] Left 5/4/2021    LEFT ARM SKIN GRAFT SPLIT THICKNESS performed by Malu Pinto MD at [facility]    Current Outpatient Rx   Medication Sig Dispense Refill    Multiple Vitamins-Minerals (THERAPEUTIC MULTIVITAMIN-MINERALS) tablet Take 1 tablet by mouth daily         ALLERGIES    No Known Allergies    SOCIAL AND FAMILY HISTORY    Social History Socioeconomic History    Marital status: Single     Spouse name: None    Number of children: None    Years of education: None    Highest education level: None   Occupational History    None   Tobacco Use    Smoking status: Never Smoker    Smokeless tobacco: Never Used   Vaping Use    Vaping Use: Never used   Substance and Sexual Activity    Alcohol use: Not Currently    Drug use: Not Currently    Sexual activity: Yes     Partners: Female   Other Topics Concern    None   Social History Narrative    None     Social Determinants of Health     Financial Resource Strain:     Difficulty of Paying Living Expenses: Not on file   Food Insecurity:     Worried About Running Out of Food in the Last Year: Not on file    John of Food in the Last Year: Not on file   Transportation Needs:     Lack of Transportation (Medical): Not on file    Lack of Transportation (Non-Medical):  Not on file   Physical Activity:     Days of Exercise per Week: Not on file    Minutes of Exercise per Session: Not on file   Stress:     Feeling of Stress : Not on file   Social Connections:     Frequency of Communication with Friends and Family: Not on file    Frequency of Social Gatherings with Friends and Family: Not on file    Attends Adventist Services: Not on file    Active Member of [address] [place] [address] [employer] or Organizations: Not on file    Attends Club or Organization Meetings: Not on file    Marital Status: Not on file   Intimate Partner Violence:     Fear of Current or Ex-Partner: Not on file    Emotionally Abused: Not on file    Physically Abused: Not on file    Sexually Abused: Not on file   Housing Stability:     Unable to Pay for Housing in the Last Year: Not on file    Number of [place] in the Last Year: Not on file    Unstable Housing in the Last Year: Not on file     Family History   Problem Relation Age of Onset    Substance Abuse Mother     Alcohol Abuse Mother     Cancer Maternal Grandmother     Alcohol Abuse Maternal Grandmother        PHYSICAL EXAM    VITAL SIGNS: /82   Pulse 92   Temp 98.6 °F (37 °C) (Oral)   Resp 18   Ht 6' 2\" (1.88 m)   Wt 195 lb (88.5 kg)   SpO2 97%   BMI 25.04 kg/m²   Constitutional:  Well developed, well nourished  Eyes:  Sclera nonicteric, conjunctiva moist  HENT:  Atraumatic. PERRL. Neck/Lymphatics: supple, no JVD, no swollen nodes  Respiratory:  No retractions, no accessory muscle use, normal breath sounds   Cardiovascular:  normal rate, normal rhythm  GI:    No gross discoloration. Bowel sounds present, no audible bruits. Soft,  no guarding,   no abdominal tenderness, no rebound, no palpable pulsatile masses,   No McBurney's point tenderness   Negative Rovsing sign    Negative Soto's sign. Back:   No CVA tenderness to percussion.   Musculoskeletal:  No edema, no deformity  Vascular: DP pulses 2+ equal bilaterally  Integument: No rash, dry skin  Neurologic:  Alert & oriented, normal speech  Psychiatric: Cooperative, pleasant affect       LABS:  Results for orders placed or performed during the hospital encounter of 06/05/22   CBC with Auto Differential   Result Value Ref Range    WBC 4.9 4.0 - 10.5 K/CU MM    RBC 6.13 4.6 - 6.2 M/CU MM    Hemoglobin 16.4 13.5 - 18.0 GM/DL    Hematocrit 50.7 42 - 52 %    MCV 82.7 78 - 100 FL    MCH 26.8 (L) 27 - 31 PG    MCHC 32.3 32.0 - 36.0 %    RDW 11.4 (L) 11.7 - 14.9 %    Platelets 748 781 - 067 K/CU MM    MPV 10.8 7.5 - 11.1 FL    Differential Type AUTOMATED DIFFERENTIAL     Segs Relative 51.8 36 - 66 %    Lymphocytes % 33.8 24 - 44 %    Monocytes % 13.6 (H) 0 - 4 %    Eosinophils % 0.2 0 - 3 %    Basophils % 0.4 0 - 1 %    Segs Absolute 2.5 K/CU MM    Lymphocytes Absolute 1.6 K/CU MM    Monocytes Absolute 0.7 K/CU MM    Eosinophils Absolute 0.0 K/CU MM    Basophils Absolute 0.0 K/CU MM    Nucleated RBC % 0.0 %    Total Nucleated RBC 0.0 K/CU MM    Total Immature Neutrophil 0.01 K/CU MM    Immature Neutrophil % 0.2 0 - 0.43 % Comprehensive Metabolic Panel   Result Value Ref Range    CO2 22 21 - 32 MMOL/L    BUN 24 (H) 6 - 23 MG/DL    CREATININE 1.2 0.9 - 1.3 MG/DL    Glucose 106 (H) 70 - 99 MG/DL    Calcium 8.7 8.3 - 10.6 MG/DL    Albumin 4.3 3.4 - 5.0 GM/DL    Total Protein 7.9 6.4 - 8.2 GM/DL    Total Bilirubin 1.3 (H) 0.0 - 1.0 MG/DL    ALT 17 10 - 40 U/L    AST 14 (L) 15 - 37 IU/L    Alkaline Phosphatase 87 40 - 128 IU/L    GFR Non-African American >60 >60 mL/min/1.73m2    GFR African American >60 >60 mL/min/1.73m2   Lipase   Result Value Ref Range    Lipase 107 (H) 13 - 60 IU/L   Troponin   Result Value Ref Range    Troponin T <0.010 <0.01 NG/ML   EKG 12 Lead   Result Value Ref Range    Ventricular Rate 83 BPM    Atrial Rate 83 BPM    P-R Interval 140 ms    QRS Duration 76 ms    Q-T Interval 360 ms    QTc Calculation (Bazett) 423 ms    P Axis 86 degrees    R Axis 76 degrees    T Axis 61 degrees    Diagnosis       Normal sinus rhythm  Biatrial enlargement  Left ventricular hypertrophy  Nonspecific T wave abnormality  Abnormal ECG  When compared with ECG of 08-APR-2021 09:55,  No significant change was found             EKG Interpretation  Please see ED physician's note for EKG interpretation      ED [address] [facility]      Patient presents as above. Abdomen is soft, nontender on my exam.  Patient has negative Soto sign. Patient provide IV fluids, Zofran. CBC within normal limits. Lipase is slightly elevated 107. Troponin negative. see physician note for EKG reading. On reevaluation patient states he is feeling better, nausea has improved. Patient tolerates p.o. fluids. Remainder of metabolic panel is pending at the end of my shift, see supervising physician note for final impression and plan. Clinical  IMPRESSION    1. Non-intractable vomiting with nausea, unspecified vomiting type    2. Elevated lipase        See supervising physician note for final impression and plan.       Comment: Please note this report has been produced using speech recognition software and may contain errors related to that system including errors in grammar, punctuation, and spelling, as well as words and phrases that may be inappropriate. If there are any questions or concerns please feel free to contact the dictating provider for clarification.         Juan Payton PA-C  06/05/22 [id number]

## 2022-06-05 NOTE — ED PROVIDER NOTES
EKG per my interpretation demonstrates normal sinus rhythm at a rate of 83 bpm.  Normal axis. Normal intervals. T wave inversions present in leads III and aVF. No other acute ST segment changes noted. Appears similar compared to previous EKG.       [address] DO Mathew  06/05/22 [id number]

## 2022-06-05 NOTE — ED PROVIDER NOTES
ATTENDING NOTE:    I discussed this patient's history and physical findings and reviewed the PA's findings with them, as well as performed an independent assessment and coordinated care with them. My additional findings are:    HISTORY OF PRESENT ILLNESS:  Chief Complaint   Patient presents with    Emesis    Fatigue     5 days    . Manasa Ochoa is a 49 y.o. male who presents with nausea and nonbloody nonbilious emesis and generalized fatigue for the last 5 days. Constant. No known triggering or modifying factors. PHYSICAL EXAM:  VITAL SIGNS:   ED Triage Vitals [06/05/22 0300]   Enc Vitals Group      /82      Heart Rate 92      Resp 18      Temp 98.6 °F (37 °C)      Temp Source Oral      SpO2 97 %      Weight 195 lb (88.5 kg)      Height 6' 2\" (1.88 m)      Head Circumference       Peak Flow       Pain Score       Pain Loc       Pain Edu? Excl. in [address]? Vitals during ED course were reviewed and are as charted. Key Physical Exam Findings:    Constitutional: Minimal distress, Non-toxic appearance    Eyes:  Conjunctiva normal, No discharge    HENT: Normocephalic, Atraumatic, Bilateral external ears normal, posterior oropharynx is nonerythematous and without exudate, uvula is midline, no trismus, no \"hot potato voice\" or dysphonia, oropharynx moist    Neck: Supple, no stridor, no grossly visible or palpable masses    Cardiovascular: Regular rate and rhythm, No murmurs, No rubs, No gallops    Pulmonary/Chest:  Normal breath sounds, No respiratory distress or accessory muscle use, No wheezing, crackles or rhonchi. Abdomen:  Soft, nondistended and nonrigid, No tenderness or peritoneal signs, No masses, normal bowel sounds    Back:  No midline point tenderness, No paraspinous muscle tenderness.   No CVA tenderness    Extremities:  No gross deformities, no edema, no tenderness    Neurologic:  Normal motor function, Normal sensory function, No focal deficits    Skin:  Warm, Dry, No erythema, No rash, No cyanosis, No mottling    Lymphatic:  No lymphadenopathy in the following location(s): cervical    Psychiatric:  Alert and oriented x3, Affect normal          RADIOLOGY/PROCEDURES/LABS/MEDICATIONS ADMINISTERED:    I have reviewed and interpreted all of the currently available lab results from this visit (if applicable):  Results for orders placed or performed during the hospital encounter of 06/05/22   CBC with Auto Differential   Result Value Ref Range    WBC 4.9 4.0 - 10.5 K/CU MM    RBC 6.13 4.6 - 6.2 M/CU MM    Hemoglobin 16.4 13.5 - 18.0 GM/DL    Hematocrit 50.7 42 - 52 %    MCV 82.7 78 - 100 FL    MCH 26.8 (L) 27 - 31 PG    MCHC 32.3 32.0 - 36.0 %    RDW 11.4 (L) 11.7 - 14.9 %    Platelets 267 159 - 480 K/CU MM    MPV 10.8 7.5 - 11.1 FL    Differential Type AUTOMATED DIFFERENTIAL     Segs Relative 51.8 36 - 66 %    Lymphocytes % 33.8 24 - 44 %    Monocytes % 13.6 (H) 0 - 4 %    Eosinophils % 0.2 0 - 3 %    Basophils % 0.4 0 - 1 %    Segs Absolute 2.5 K/CU MM    Lymphocytes Absolute 1.6 K/CU MM    Monocytes Absolute 0.7 K/CU MM    Eosinophils Absolute 0.0 K/CU MM    Basophils Absolute 0.0 K/CU MM    Nucleated RBC % 0.0 %    Total Nucleated RBC 0.0 K/CU MM    Total Immature Neutrophil 0.01 K/CU MM    Immature Neutrophil % 0.2 0 - 0.43 %   Comprehensive Metabolic Panel   Result Value Ref Range    CO2 22 21 - 32 MMOL/L    BUN 24 (H) 6 - 23 MG/DL    CREATININE 1.2 0.9 - 1.3 MG/DL    Glucose 106 (H) 70 - 99 MG/DL    Calcium 8.7 8.3 - 10.6 MG/DL    Albumin 4.3 3.4 - 5.0 GM/DL    Total Protein 7.9 6.4 - 8.2 GM/DL    Total Bilirubin 1.3 (H) 0.0 - 1.0 MG/DL    ALT 17 10 - 40 U/L    AST 14 (L) 15 - 37 IU/L    Alkaline Phosphatase 87 40 - 128 IU/L    GFR Non-African American >60 >60 mL/min/1.73m2    GFR African American >60 >60 mL/min/1.73m2   Lipase   Result Value Ref Range    Lipase 107 (H) 13 - 60 IU/L   Troponin   Result Value Ref Range    Troponin T <0.010 <0.01 NG/ML   EKG 12 Lead   Result Value Ref Range    Ventricular Rate 83 BPM    Atrial Rate 83 BPM    P-R Interval 140 ms    QRS Duration 76 ms    Q-T Interval 360 ms    QTc Calculation (Bazett) 423 ms    P Axis 86 degrees    R Axis 76 degrees    T Axis 61 degrees    Diagnosis       Normal sinus rhythm  Biatrial enlargement  Left ventricular hypertrophy  Nonspecific T wave abnormality  Abnormal ECG  When compared with ECG of 08-APR-2021 09:55,  No significant change was found            ABNORMAL LABS:  Labs Reviewed   CBC WITH AUTO DIFFERENTIAL - Abnormal; Notable for the following components:       Result Value    MCH 26.8 (*)     RDW 11.4 (*)     Monocytes % 13.6 (*)     All other components within normal limits   COMPREHENSIVE METABOLIC PANEL - Abnormal; Notable for the following components:    BUN 24 (*)     Glucose 106 (*)     Total Bilirubin 1.3 (*)     AST 14 (*)     All other components within normal limits   LIPASE - Abnormal; Notable for the following components:    Lipase 107 (*)     All other components within normal limits   TROPONIN         IMAGING STUDIES ORDERED:  ED NURSING COMMUNICATION      No orders to display         MEDICATIONS ADMINISTERED:  Medications   0.9 % sodium chloride bolus (1,000 mLs IntraVENous New Bag 6/5/22 0448)   ondansetron (ZOFRAN) injection 4 mg (4 mg IntraVENous Given 6/5/22 0448)         PLAN/ED COURSE:  Last Vitals: /82   Pulse 92   Temp 98.6 °F (37 °C) (Oral)   Resp 18   Ht 6' 2\" (1.88 m)   Wt 195 lb (88.5 kg)   SpO2 97%   BMI 25.04 kg/m²     49year-old male with nausea and vomiting. Unclear cause. No evidence to suggest acute cardiac process. No evidence for any significant electrolyte or metabolic abnormalities or dehydration. Has been given the above medications with significant improvement in symptoms and has been able to tolerate p.o. challenge well. Has a mildly elevated lipase but not at a level suggesting pancreatitis and he is not having any epigastric abdominal pain.     Additional workup and treatment in the ED as documented above. Patient reassured and will be discharged home. I have explained to the patient in appropriate terminology our work-up in the ED and their diagnosis. I have also given anticipatory guidance and expectant management of their condition as an outpatient as per my custom. The patient was given clear discharge and follow-up instructions including return to the ER immediately for worsening concerns. The patient has been advised to follow-up with their primary care physician and/or referred physician in the next two to three days or sooner if worsening and to return to the ER immediately as above with any concerns. I provided the patient counseling with regard to my customary list of strict return precautions as well as return precautions specific to the cause for today's emergency department visit. The patient will return under these provided conditions, but should also return for new concerns or further worsening. Pt and/or family understand and agree with plan. Clinical Impression:  1. Non-intractable vomiting with nausea, unspecified vomiting type    2.  Elevated lipase        Disposition referral (if applicable):  AILEEN Man CNP  [facility] [address]  [phone]    Schedule an appointment as soon as possible for a visit in 2 days  For Recheck, Return to ED if symptoms change or worsen      Disposition medications (if applicable):  New Prescriptions    No medications on file       ED Provider Disposition Time  DISPOSITION              Electronically signed by Octavio Mackey MD on 6/5/2022 at 6:07 AM        Octavio Mackey MD  06/05/22 0126

## 2022-06-06 LAB
EKG ATRIAL RATE: 83 BPM
EKG DIAGNOSIS: NORMAL
EKG P AXIS: 86 DEGREES
EKG P-R INTERVAL: 140 MS
EKG Q-T INTERVAL: 360 MS
EKG QRS DURATION: 76 MS
EKG QTC CALCULATION (BAZETT): 423 MS
EKG R AXIS: 76 DEGREES
EKG T AXIS: 61 DEGREES
EKG VENTRICULAR RATE: 83 BPM

## 2022-06-06 PROCEDURE — 93010 ELECTROCARDIOGRAM REPORT: CPT | Performed by: INTERNAL MEDICINE

## (undated) DEVICE — TRAY PREP DRY W/ PREM GLV 2 APPL 6 SPNG 2 UNDPD 1 OVERWRAP

## (undated) DEVICE — BANDAGE,GAUZE,BULKEE II,4.5"X4.1YD,STRL: Brand: MEDLINE

## (undated) DEVICE — PAD,ABDOMINAL,5"X9",ST,LF,25/BX: Brand: MEDLINE INDUSTRIES, INC.

## (undated) DEVICE — BLADE CLIPPER GEN PURP NS

## (undated) DEVICE — ELECTRODE ES AD CRDLSS PT RET REM POLYHESIVE

## (undated) DEVICE — SPONGE GZ W4XL8IN COT WVN 12 PLY

## (undated) DEVICE — SYRINGE IRRIG 60ML SFT PLIABLE BLB EZ TO GRP 1 HND USE W/

## (undated) DEVICE — TOWEL,OR,DSP,ST,BLUE,STD,6/PK,12PK/CS: Brand: MEDLINE

## (undated) DEVICE — Z DUPLICATE USE 2624755 KIT NEG PRSS DSG W/ PRSS INDIC PTCH STRP 45ML CANSTR CARR

## (undated) DEVICE — PENCIL ES CRD L10FT HND SWCHING ROCK SWCH W/ EDGE COAT BLDE

## (undated) DEVICE — DERMATOME BLADES: Brand: DERMATOME

## (undated) DEVICE — INTENDED FOR TISSUE SEPARATION, AND OTHER PROCEDURES THAT REQUIRE A SHARP SURGICAL BLADE TO PUNCTURE OR CUT.: Brand: BARD-PARKER ® STAINLESS STEEL BLADES

## (undated) DEVICE — DRESSING CNTCT LAYR SIL 4X7.2IN MEPITEL

## (undated) DEVICE — BANDAGE COMPR W6INXL5YD WHT BGE POLY COT M E WRP WV HK AND

## (undated) DEVICE — MARKER SURG SKIN UTIL REGULAR/FINE 2 TIP W/ RUL AND 9 LBL

## (undated) DEVICE — DRESSING,GAUZE,XEROFORM,CURAD,5"X9",ST: Brand: CURAD

## (undated) DEVICE — SOLUTION IV IRRIG POUR BRL 0.9% SODIUM CHL 2F7124

## (undated) DEVICE — 1.5 TO 1 DERMACARRIER: Brand: MESHGRAFTTM  II TISSUE EXPANSION SYSTEM

## (undated) DEVICE — GOWN,ECLIPSE,POLYRNF,BRTHSLV,XL,30/CS: Brand: MEDLINE

## (undated) DEVICE — 4-PORT MANIFOLD: Brand: NEPTUNE 2

## (undated) DEVICE — GLOVE ORANGE PI 7 1/2   MSG9075

## (undated) DEVICE — TUBE CULTURE LF UNIFORM BOTTOM STER

## (undated) DEVICE — GAUZE,SPONGE,4"X4",16PLY,XRAY,STRL,LF: Brand: MEDLINE

## (undated) DEVICE — DRESSING NEG PRSS SM 10X7.5X3.3CM POLYUR FOR WND THER VAC

## (undated) DEVICE — KIT NEG PRSS M W12.5XH3.2XL18CM 2 SHT STD DRP 1 SENSATRAC

## (undated) DEVICE — DRAPE,EXTREMITY,89X128,STERILE: Brand: MEDLINE

## (undated) DEVICE — COUNTER NDL 30 COUNT FOAM STRP SGL MAG

## (undated) DEVICE — TUBING, SUCTION, 9/32" X 10', STRAIGHT: Brand: MEDLINE

## (undated) DEVICE — GLOVE SURG SZ 65 CRM LTX FREE POLYISOPRENE POLYMER BEAD ANTI

## (undated) DEVICE — SPONGE LAP W18XL18IN WHT COT 4 PLY FLD STRUNG RADPQ DISP ST

## (undated) DEVICE — APPLICATOR MEDICATED 26 CC SOLUTION HI LT ORNG CHLORAPREP

## (undated) DEVICE — DRAPE SHEET ULTRAGARD: Brand: MEDLINE

## (undated) DEVICE — Device

## (undated) DEVICE — GOWN,ECLIPSE,POLYRNF,BRTHSLV,L,30/CS: Brand: MEDLINE

## (undated) DEVICE — SOLUTION IV IRRIG WATER 1000ML POUR BRL 2F7114